# Patient Record
Sex: FEMALE | Race: OTHER | HISPANIC OR LATINO
[De-identification: names, ages, dates, MRNs, and addresses within clinical notes are randomized per-mention and may not be internally consistent; named-entity substitution may affect disease eponyms.]

---

## 2017-05-22 ENCOUNTER — APPOINTMENT (OUTPATIENT)
Dept: ULTRASOUND IMAGING | Facility: CLINIC | Age: 37
End: 2017-05-22

## 2017-05-22 ENCOUNTER — OUTPATIENT (OUTPATIENT)
Dept: OUTPATIENT SERVICES | Facility: HOSPITAL | Age: 37
LOS: 1 days | End: 2017-05-22
Payer: COMMERCIAL

## 2017-05-22 DIAGNOSIS — Z98.51 TUBAL LIGATION STATUS: Chronic | ICD-10-CM

## 2017-05-22 DIAGNOSIS — Z00.8 ENCOUNTER FOR OTHER GENERAL EXAMINATION: ICD-10-CM

## 2017-05-22 PROCEDURE — 76856 US EXAM PELVIC COMPLETE: CPT

## 2017-05-22 PROCEDURE — 76830 TRANSVAGINAL US NON-OB: CPT

## 2017-05-26 ENCOUNTER — RESULT REVIEW (OUTPATIENT)
Age: 37
End: 2017-05-26

## 2017-06-14 ENCOUNTER — OUTPATIENT (OUTPATIENT)
Dept: OUTPATIENT SERVICES | Facility: HOSPITAL | Age: 37
LOS: 1 days | End: 2017-06-14
Payer: MEDICAID

## 2017-06-14 ENCOUNTER — APPOINTMENT (OUTPATIENT)
Dept: RADIOLOGY | Facility: CLINIC | Age: 37
End: 2017-06-14

## 2017-06-14 DIAGNOSIS — Z98.51 TUBAL LIGATION STATUS: Chronic | ICD-10-CM

## 2017-06-14 DIAGNOSIS — Z00.8 ENCOUNTER FOR OTHER GENERAL EXAMINATION: ICD-10-CM

## 2017-06-14 PROCEDURE — 73564 X-RAY EXAM KNEE 4 OR MORE: CPT | Mod: 26,RT

## 2017-06-14 PROCEDURE — 73564 X-RAY EXAM KNEE 4 OR MORE: CPT

## 2017-10-31 ENCOUNTER — INPATIENT (INPATIENT)
Facility: HOSPITAL | Age: 37
LOS: 1 days | Discharge: HOME | End: 2017-11-02
Attending: INTERNAL MEDICINE | Admitting: INTERNAL MEDICINE
Payer: MEDICAID

## 2017-10-31 VITALS — WEIGHT: 197.98 LBS

## 2017-10-31 DIAGNOSIS — Z98.51 TUBAL LIGATION STATUS: Chronic | ICD-10-CM

## 2017-10-31 LAB
ADD ON TEST-SPECIMEN IN LAB: SIGNIFICANT CHANGE UP
ALBUMIN SERPL ELPH-MCNC: 4.1 G/DL — SIGNIFICANT CHANGE UP (ref 3.3–5)
ALP SERPL-CCNC: 105 U/L — SIGNIFICANT CHANGE UP (ref 40–120)
ALT FLD-CCNC: 21 U/L — SIGNIFICANT CHANGE UP (ref 12–78)
ANION GAP SERPL CALC-SCNC: 6 MMOL/L — SIGNIFICANT CHANGE UP (ref 5–17)
APPEARANCE UR: CLEAR — SIGNIFICANT CHANGE UP
APTT BLD: 33 SEC — SIGNIFICANT CHANGE UP (ref 27.5–37.4)
AST SERPL-CCNC: 15 U/L — SIGNIFICANT CHANGE UP (ref 15–37)
BASOPHILS # BLD AUTO: 0.1 K/UL — SIGNIFICANT CHANGE UP (ref 0–0.2)
BASOPHILS NFR BLD AUTO: 0 % — SIGNIFICANT CHANGE UP (ref 0–2)
BILIRUB SERPL-MCNC: 0.5 MG/DL — SIGNIFICANT CHANGE UP (ref 0.2–1.2)
BILIRUB UR-MCNC: NEGATIVE — SIGNIFICANT CHANGE UP
BUN SERPL-MCNC: 12 MG/DL — SIGNIFICANT CHANGE UP (ref 7–23)
CALCIUM SERPL-MCNC: 9.1 MG/DL — SIGNIFICANT CHANGE UP (ref 8.5–10.1)
CHLORIDE SERPL-SCNC: 107 MMOL/L — SIGNIFICANT CHANGE UP (ref 96–108)
CO2 SERPL-SCNC: 24 MMOL/L — SIGNIFICANT CHANGE UP (ref 22–31)
COLOR SPEC: YELLOW — SIGNIFICANT CHANGE UP
CREAT SERPL-MCNC: 0.76 MG/DL — SIGNIFICANT CHANGE UP (ref 0.5–1.3)
DIFF PNL FLD: (no result)
EOSINOPHIL # BLD AUTO: 0.1 K/UL — SIGNIFICANT CHANGE UP (ref 0–0.5)
EOSINOPHIL NFR BLD AUTO: 0 % — SIGNIFICANT CHANGE UP (ref 0–6)
GLUCOSE SERPL-MCNC: 103 MG/DL — HIGH (ref 70–99)
GLUCOSE UR QL: NEGATIVE MG/DL — SIGNIFICANT CHANGE UP
HCT VFR BLD CALC: 42.1 % — SIGNIFICANT CHANGE UP (ref 34.5–45)
HGB BLD-MCNC: 13.9 G/DL — SIGNIFICANT CHANGE UP (ref 11.5–15.5)
INR BLD: 1.06 RATIO — SIGNIFICANT CHANGE UP (ref 0.88–1.16)
KETONES UR-MCNC: (no result)
LEUKOCYTE ESTERASE UR-ACNC: (no result)
LYMPHOCYTES # BLD AUTO: 16 % — SIGNIFICANT CHANGE UP (ref 13–44)
LYMPHOCYTES # BLD AUTO: 2.9 K/UL — SIGNIFICANT CHANGE UP (ref 1–3.3)
MANUAL DIF COMMENT BLD-IMP: SIGNIFICANT CHANGE UP
MCHC RBC-ENTMCNC: 26.9 PG — LOW (ref 27–34)
MCHC RBC-ENTMCNC: 33 GM/DL — SIGNIFICANT CHANGE UP (ref 32–36)
MCV RBC AUTO: 81.6 FL — SIGNIFICANT CHANGE UP (ref 80–100)
MONOCYTES # BLD AUTO: 0.9 K/UL — SIGNIFICANT CHANGE UP (ref 0–0.9)
MONOCYTES NFR BLD AUTO: 4 % — SIGNIFICANT CHANGE UP (ref 2–14)
NEUTROPHILS # BLD AUTO: 15 K/UL — HIGH (ref 1.8–7.4)
NEUTROPHILS NFR BLD AUTO: 80 % — HIGH (ref 43–77)
NITRITE UR-MCNC: NEGATIVE — SIGNIFICANT CHANGE UP
PH UR: 5 — SIGNIFICANT CHANGE UP (ref 5–8)
PLAT MORPH BLD: NORMAL — SIGNIFICANT CHANGE UP
PLATELET # BLD AUTO: 377 K/UL — SIGNIFICANT CHANGE UP (ref 150–400)
POTASSIUM SERPL-MCNC: 3.6 MMOL/L — SIGNIFICANT CHANGE UP (ref 3.5–5.3)
POTASSIUM SERPL-SCNC: 3.6 MMOL/L — SIGNIFICANT CHANGE UP (ref 3.5–5.3)
PROT SERPL-MCNC: 8 GM/DL — SIGNIFICANT CHANGE UP (ref 6–8.3)
PROT UR-MCNC: NEGATIVE MG/DL — SIGNIFICANT CHANGE UP
PROTHROM AB SERPL-ACNC: 11.5 SEC — SIGNIFICANT CHANGE UP (ref 9.8–12.7)
RBC # BLD: 5.16 M/UL — SIGNIFICANT CHANGE UP (ref 3.8–5.2)
RBC # FLD: 13.6 % — SIGNIFICANT CHANGE UP (ref 10.3–14.5)
RBC BLD AUTO: NORMAL — SIGNIFICANT CHANGE UP
RBC CASTS # UR COMP ASSIST: SIGNIFICANT CHANGE UP /HPF (ref 0–4)
SODIUM SERPL-SCNC: 137 MMOL/L — SIGNIFICANT CHANGE UP (ref 135–145)
SP GR SPEC: 1.02 — SIGNIFICANT CHANGE UP (ref 1.01–1.02)
UROBILINOGEN FLD QL: NEGATIVE MG/DL — SIGNIFICANT CHANGE UP
WBC # BLD: 19 K/UL — HIGH (ref 3.8–10.5)
WBC # FLD AUTO: 19 K/UL — HIGH (ref 3.8–10.5)
WBC UR QL: SIGNIFICANT CHANGE UP

## 2017-10-31 PROCEDURE — 74177 CT ABD & PELVIS W/CONTRAST: CPT | Mod: 26

## 2017-10-31 PROCEDURE — 99285 EMERGENCY DEPT VISIT HI MDM: CPT

## 2017-10-31 PROCEDURE — 70450 CT HEAD/BRAIN W/O DYE: CPT | Mod: 26

## 2017-10-31 PROCEDURE — 71020: CPT | Mod: 26

## 2017-10-31 RX ORDER — SODIUM CHLORIDE 9 MG/ML
3 INJECTION INTRAMUSCULAR; INTRAVENOUS; SUBCUTANEOUS ONCE
Qty: 0 | Refills: 0 | Status: COMPLETED | OUTPATIENT
Start: 2017-10-31 | End: 2017-10-31

## 2017-10-31 RX ORDER — SODIUM CHLORIDE 9 MG/ML
1500 INJECTION INTRAMUSCULAR; INTRAVENOUS; SUBCUTANEOUS ONCE
Qty: 0 | Refills: 0 | Status: COMPLETED | OUTPATIENT
Start: 2017-10-31 | End: 2017-10-31

## 2017-10-31 RX ORDER — ONDANSETRON 8 MG/1
4 TABLET, FILM COATED ORAL ONCE
Qty: 0 | Refills: 0 | Status: COMPLETED | OUTPATIENT
Start: 2017-10-31 | End: 2017-10-31

## 2017-10-31 RX ORDER — MORPHINE SULFATE 50 MG/1
4 CAPSULE, EXTENDED RELEASE ORAL ONCE
Qty: 0 | Refills: 0 | Status: DISCONTINUED | OUTPATIENT
Start: 2017-10-31 | End: 2017-10-31

## 2017-10-31 RX ORDER — PIPERACILLIN AND TAZOBACTAM 4; .5 G/20ML; G/20ML
3.38 INJECTION, POWDER, LYOPHILIZED, FOR SOLUTION INTRAVENOUS ONCE
Qty: 0 | Refills: 0 | Status: COMPLETED | OUTPATIENT
Start: 2017-10-31 | End: 2017-10-31

## 2017-10-31 RX ADMIN — ONDANSETRON 4 MILLIGRAM(S): 8 TABLET, FILM COATED ORAL at 21:36

## 2017-10-31 RX ADMIN — PIPERACILLIN AND TAZOBACTAM 200 GRAM(S): 4; .5 INJECTION, POWDER, LYOPHILIZED, FOR SOLUTION INTRAVENOUS at 23:46

## 2017-10-31 RX ADMIN — SODIUM CHLORIDE 3 MILLILITER(S): 9 INJECTION INTRAMUSCULAR; INTRAVENOUS; SUBCUTANEOUS at 20:11

## 2017-10-31 RX ADMIN — MORPHINE SULFATE 4 MILLIGRAM(S): 50 CAPSULE, EXTENDED RELEASE ORAL at 21:35

## 2017-10-31 RX ADMIN — SODIUM CHLORIDE 1000 MILLILITER(S): 9 INJECTION INTRAMUSCULAR; INTRAVENOUS; SUBCUTANEOUS at 20:11

## 2017-10-31 NOTE — ED STATDOCS - MEDICAL DECISION MAKING DETAILS
Mitchell WHITE: Patient provided with the results of the labs and imaging. Outpatient follow up and instructions to return if any health concerns provided.

## 2017-10-31 NOTE — ED STATDOCS - GASTROINTESTINAL, MLM
abdomen soft, +diffuse abdominal tenderness, and non-distended. Bowel sounds present. abdomen soft, +diffuse abdominal tenderness worse in epigastric region, and non-distended. Bowel sounds present.

## 2017-10-31 NOTE — ED ADULT NURSE NOTE - OBJECTIVE STATEMENT
pt c/o abdominal pain, 6/10 denies vomiting states she ate some honey then developed the pain. No c/o diarrhea.

## 2017-10-31 NOTE — ED STATDOCS - PROGRESS NOTE DETAILS
Patient seen and evaluated, ED attending note and orders reviewed, will continue with patient follow up and care -Joaquin Upton PA-C Patient updated on labs, reports her pain is improving with just IVF, right now abdomen is mildly TTP diffusely, she is comfortably waiting for CT with family at her bedside -Joaquin Upton PA-C Admitted patient for duodenitis, leukocytosis, and inability to tolerate PO/pain. Patient informed. Hospitalist admission is appreciated. Spoke with Dr. Beth YATES who will see patient once admitted. GI consult is appreciated.

## 2017-10-31 NOTE — ED STATDOCS - CARE PLAN
Principal Discharge DX:	Abdominal pain, unspecified abdominal location  Secondary Diagnosis:	Vertigo Principal Discharge DX:	Duodenitis  Secondary Diagnosis:	Vertigo  Secondary Diagnosis:	Abdominal pain, unspecified abdominal location

## 2017-10-31 NOTE — ED STATDOCS - NS_ ATTENDINGSCRIBEDETAILS _ED_A_ED_FT
I Mukesh Medrano MD saw and examined the patient. Scribe documented for me and under my supervision. I have modified the scribe's documentation where necessary to reflect my history, physical exam findings and other relevant documentations.

## 2017-10-31 NOTE — ED STATDOCS - NEUROLOGICAL, MLM
sensation is normal and strength is normal. sensation is normal and strength is normal. CNs 2-12 intact. No dysarthria. No aphasia. NIH=0, No facial asymmetry. No inattention. EOMI. 5/5 strength on flexion and extension.

## 2017-10-31 NOTE — ED STATDOCS - OBJECTIVE STATEMENT
36 yo female s/p tubal ligation and resection of benign tumor on left thigh, presents c/o dizziness, abdominal pain, N/V that began 30 minutes after eating raw honey.  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  Denies CP, fever, chills, blood in stool or vomitus, vaginal bleeding. 38 yo female with PMH of tubal ligation and resection of benign tumor on left thigh, presents c/o dizziness, abdominal pain, N/V that began 30 minutes after eating raw honey.  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  Denies CP, fever, chills, blood in stool or vomitus, vaginal bleeding. Has pain that is worse on the epigastrium, that occasionally radiates to the back. Also states she is vertiginous, and unsteady on her feet and has mild global gradual onset HA.

## 2017-10-31 NOTE — ED STATDOCS - ATTENDING CONTRIBUTION TO CARE
I Mukesh Medrano MD saw and examined the patient. MLP saw and examined the patient under my supervision. I agree with [facility]'s plan, assessment and care of this patient in the ED as well as her documentation.

## 2017-10-31 NOTE — ED ADULT TRIAGE NOTE - CHIEF COMPLAINT QUOTE
pt presents to ED with complaints of Abdominal Pain for 2 hours PTA pt states she ate honey and then developed, pt states she vomited x 4 since

## 2017-11-01 LAB
ANION GAP SERPL CALC-SCNC: 3 MMOL/L — LOW (ref 5–17)
BUN SERPL-MCNC: 10 MG/DL — SIGNIFICANT CHANGE UP (ref 7–23)
CALCIUM SERPL-MCNC: 8.7 MG/DL — SIGNIFICANT CHANGE UP (ref 8.5–10.1)
CHLORIDE SERPL-SCNC: 107 MMOL/L — SIGNIFICANT CHANGE UP (ref 96–108)
CO2 SERPL-SCNC: 28 MMOL/L — SIGNIFICANT CHANGE UP (ref 22–31)
CREAT SERPL-MCNC: 0.75 MG/DL — SIGNIFICANT CHANGE UP (ref 0.5–1.3)
CULTURE RESULTS: SIGNIFICANT CHANGE UP
GLUCOSE SERPL-MCNC: 104 MG/DL — HIGH (ref 70–99)
HCT VFR BLD CALC: 38.1 % — SIGNIFICANT CHANGE UP (ref 34.5–45)
HGB BLD-MCNC: 12.6 G/DL — SIGNIFICANT CHANGE UP (ref 11.5–15.5)
MCHC RBC-ENTMCNC: 27.4 PG — SIGNIFICANT CHANGE UP (ref 27–34)
MCHC RBC-ENTMCNC: 33.1 GM/DL — SIGNIFICANT CHANGE UP (ref 32–36)
MCV RBC AUTO: 82.6 FL — SIGNIFICANT CHANGE UP (ref 80–100)
PLATELET # BLD AUTO: 277 K/UL — SIGNIFICANT CHANGE UP (ref 150–400)
POTASSIUM SERPL-MCNC: 3.7 MMOL/L — SIGNIFICANT CHANGE UP (ref 3.5–5.3)
POTASSIUM SERPL-SCNC: 3.7 MMOL/L — SIGNIFICANT CHANGE UP (ref 3.5–5.3)
RBC # BLD: 4.61 M/UL — SIGNIFICANT CHANGE UP (ref 3.8–5.2)
RBC # FLD: 14.5 % — SIGNIFICANT CHANGE UP (ref 10.3–14.5)
SODIUM SERPL-SCNC: 138 MMOL/L — SIGNIFICANT CHANGE UP (ref 135–145)
SPECIMEN SOURCE: SIGNIFICANT CHANGE UP
WBC # BLD: 12.6 K/UL — HIGH (ref 3.8–10.5)
WBC # FLD AUTO: 12.6 K/UL — HIGH (ref 3.8–10.5)

## 2017-11-01 RX ORDER — ACETAMINOPHEN 500 MG
650 TABLET ORAL EVERY 6 HOURS
Qty: 0 | Refills: 0 | Status: DISCONTINUED | OUTPATIENT
Start: 2017-11-01 | End: 2017-11-02

## 2017-11-01 RX ORDER — ENOXAPARIN SODIUM 100 MG/ML
40 INJECTION SUBCUTANEOUS EVERY 24 HOURS
Qty: 0 | Refills: 0 | Status: DISCONTINUED | OUTPATIENT
Start: 2017-11-01 | End: 2017-11-02

## 2017-11-01 RX ORDER — INFLUENZA VIRUS VACCINE 15; 15; 15; 15 UG/.5ML; UG/.5ML; UG/.5ML; UG/.5ML
0.5 SUSPENSION INTRAMUSCULAR ONCE
Qty: 0 | Refills: 0 | Status: DISCONTINUED | OUTPATIENT
Start: 2017-11-01 | End: 2017-11-02

## 2017-11-01 RX ORDER — ONDANSETRON 8 MG/1
4 TABLET, FILM COATED ORAL EVERY 4 HOURS
Qty: 0 | Refills: 0 | Status: DISCONTINUED | OUTPATIENT
Start: 2017-11-01 | End: 2017-11-02

## 2017-11-01 RX ORDER — PANTOPRAZOLE SODIUM 20 MG/1
40 TABLET, DELAYED RELEASE ORAL
Qty: 0 | Refills: 0 | Status: DISCONTINUED | OUTPATIENT
Start: 2017-11-01 | End: 2017-11-02

## 2017-11-01 RX ADMIN — Medication 650 MILLIGRAM(S): at 08:48

## 2017-11-01 RX ADMIN — ENOXAPARIN SODIUM 40 MILLIGRAM(S): 100 INJECTION SUBCUTANEOUS at 21:01

## 2017-11-01 RX ADMIN — PANTOPRAZOLE SODIUM 40 MILLIGRAM(S): 20 TABLET, DELAYED RELEASE ORAL at 17:14

## 2017-11-01 RX ADMIN — Medication 1 TABLET(S): at 17:14

## 2017-11-01 NOTE — ED ADULT NURSE REASSESSMENT NOTE - NS ED NURSE REASSESS COMMENT FT1
attempt to call report to [facility], they will call back 20mins.  Pt aware of pending transfer to room.
pt sleeping in bed. no s/s of acute distress. waiting for hospitalist orders. will continue to monitor.
Pt currently sitting with family at bedside, aware of POC and plan to bed admitted. IV antibiotics infusing, no c/o pain at this moment. Hourly rounding completed.

## 2017-11-01 NOTE — CONSULT NOTE ADULT - ASSESSMENT
Enteritis/Duodenitis  epig pain     agree with zosyn for now  protonix  clear liquids  antiemetic prn  stool h pylori

## 2017-11-01 NOTE — CONSULT NOTE ADULT - SUBJECTIVE AND OBJECTIVE BOX
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   Gastritis    Hiatal hernia.    Past Surgical History:  Tubal ligation status 8 years ago.     Family Hx: unknown  Social History: Lives at home, has 5 children, unemployed. Nonsmoker, no ETOH or drug use.   Meds: reviewed, not on standing home meds.   NKDA    Social hx as above      REVIEW OF SYSTEMS      General:	No fever or chills    Skin/Breast: No jaundice or rash   		  ENMT: denies sore throat or thrush    Respiratory and Thorax: Denies dyspnea or cough or shortness of breath  	  Cardiovascular: Denies chest pain or palpitations 	    Gastrointestinal: Denies jaundice or pruritis    Genitourinary: Denies dysuria or hematuria	    Musculoskeletal: Denies muscular pain or swelling	    Neurological: Denies confusion or tremor	    Hematology/Lymphatics: Denies easy bruising or bleeding 	    Endocrine:	Denies polyphagia or polyuria    See above hx otherwise neg for any major organ systems    PHYSICAL EXAM:    Vital Signs Last 24 Hrs  T(C): 36.8 (01 Nov 2017 11:00), Max: 36.9 (31 Oct 2017 20:49)  T(F): 98.2 (01 Nov 2017 11:00), Max: 98.5 (31 Oct 2017 20:49)  HR: 72 (01 Nov 2017 11:00) (72 - 83)  BP: 126/79 (01 Nov 2017 11:00) (126/79 - 148/94)  BP(mean): 105 (31 Oct 2017 18:27) (105 - 105)  RR: 12 (01 Nov 2017 11:00) (12 - 18)  SpO2: 99% (01 Nov 2017 07:03) (99% - 100%)    Constitutional: no acute distress    ENMT: NC/AT scl anicteric opm pink no lesions     Neck: supple. No jvd or LN    Respiratory: Clear     Cardiovascular: RRR s1s2     Gastrointestinal: Pos bs , soft , not tender, no hepatosplenomegaly,  no mass      Back: No CVA tenderness    Extremities: NO cce     Neurological: Alert and oriented x 3     Skin: No rash or jaundice     Date/Time:11-01 @ 08:59    Albumin: --  ALT/SGPT: --  Alk Phos: --  AST/SGOT: --  Bilirubin Direct: --  Bilirubin Total: --  Ca: 8.7  eGFR : 119  eGFR Non-: 103  Lipase: --  Amylase: --  INR: --  PTT: --  Date/Time:10-31 @ 19:41    Albumin: 4.1  ALT/SGPT: 21  Alk Phos: 105  AST/SGOT: 15  Bilirubin Direct: --  Bilirubin Total: 0.5  Ca: 9.1  eGFR : 117  eGFR Non-: 101  Lipase: 99  Amylase: --  INR: 1.06  PTT: --      11-01    138  |  107  |  10  ----------------------------<  104<H>  3.7   |  28  |  0.75    Ca    8.7      01 Nov 2017 08:59    TPro  8.0  /  Alb  4.1  /  TBili  0.5  /  DBili  x   /  AST  15  /  ALT  21  /  AlkPhos  105  10-31                            12.6   12.6  )-----------( 277      ( 01 Nov 2017 08:59 )             38.1   < from: CT Abdomen and Pelvis w/ IV Cont (10.31.17 @ 22:08) >    EXAM:  CT ABDOMEN AND PELVIS IC                            PROCEDURE DATE:  10/31/2017          INTERPRETATION:  CLINICAL HISTORY: Nausea and abdominal pain.    TECHNIQUE:  CT scan of the abdomen and pelvis with IV contrast.  Transaxial images wereacquired from the domes of the diaphragm to the   symphysis pubis with intravenous contrast. Oral contrast was withheld as   per the referring clinician's request. Coronal and sagittal images were   also provided from the transaxial source data. 90mLs of Omnipaque 350 was   administered intravenously without complication and 10 mLs was discarded.    COMPARISON: There is no prior study for comparison.    FINDINGS:   The heart is not enlarged. The lung bases are clear.     There is suboptimal assessment of the bowel without oral contrast.  The large and small bowel are normal in caliber without obstruction.   There is suggestion of wall thickening of the third and fourth portions   of the duodenum with stranding and fluid in the adjacent retroperitoneal   fat. There is no free intraperitoneal air or abdominal abscess.  The   appendix is normal. The remainder of the bowel is of normal wall   thickness.    The liver, gallbladder, spleen, and adrenal glands are unremarkable. The   pancreas enhances homogeneously. The stranding in the retroperitoneal fat   is mostly inferior and posteriorly, however, could the related to a   pancreatic process. There is no main pancreatic duct dilatation. The   kidneys enhance symmetrically without hydronephrosis.     The abdominal aorta is normal in caliber. There is no retroperitoneal,   pelvic or inguinal adenopathy. There is no ascites.    Urinary bladder, uterus and adnexal structures are within normal limits.   Trace amount of free fluid in the pelvis.    The visualized osseous structures demonstrate no acute abnormality.    IMPRESSION:   Likely duodenitis, however, correlation with pancreatic enzymes is   recommended to exclude pancreatitis as etiology for the findings.                ALEXIS MITTAL; ATTENDING RADIOLOGIST  This document has been electronically signed. Oct 31 2017 10:58PM                < end of copied text >  (reviewed with Dr. Shine)=-duodenitis -enteritis

## 2017-11-01 NOTE — H&P ADULT - ASSESSMENT
This is a 38 yo female with remote history of benign tumor on left thigh resection admitted for ongoing nausea and vomiting in the setting of raw honey ingestion:    # Abdominal Pain  # Nausea and Vomiting  # Leukocytosis  - patient with symptoms suggestive of food poisoning after ingestion of raw honey currently hemodynamically stable.   - no diarrheal symptoms and currently afebrile.   - will continue supportive care with prn anti-emetics and slowly advance diet.   - Full liquids and advance.   - s/p 1.5L IVFs and IV Zosyn.   - stop further antibiotics as leukocytosis likely reactive in nature.   - repeat stat labs and trend CBC.   - GI consult aware.     # Headache - mild in the setting of illness.   - stable Head CT .  - cont w/ Tylenol prn.     DVT ppx: lovenox  Dispo: admit to floor, anticipate dc home in 1 day.     Discussed w/ staff and patient.   Total time > 65 mins. This is a 36 yo female with remote history of benign tumor on left thigh resection admitted for ongoing nausea and vomiting in the setting of raw honey ingestion:    # Abdominal Pain  # Nausea and Vomiting  # Leukocytosis  # Duodenitis  - patient with symptoms suggestive of food poisoning after ingestion of raw honey currently hemodynamically stable.   - no diarrheal symptoms and currently afebrile.   - will continue supportive care with prn anti-emetics and slowly advance diet.   - Full liquids and advance.   - s/p 1.5L IVFs and IV Zosyn.   - stop further antibiotics as leukocytosis likely reactive in nature.   - repeat stat labs and trend CBC.   - GI consult aware.     # Headache - mild in the setting of illness.   - stable Head CT .  - cont w/ Tylenol prn.     # Microscopic Hematuria - noted in UA, likely 2/2 illness.   - recommend outpatient repeat in 1-2 weeks.    DVT ppx: lovenox  Dispo: admit to floor, anticipate dc home in 1 day.     Discussed w/ staff and patient.   Total time > 65 mins.

## 2017-11-01 NOTE — H&P ADULT - HISTORY OF PRESENT ILLNESS
This is a 36 yo female with remote history of benign tumor on left thigh resection admitted for ongoing nausea and vomiting in the setting of raw honey ingestion. 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 Approximately half hour later, she experienced abdominal pain, nausea and vomiting. No reports of diarrhea, chest pain, fevers or chills.     In the ED, CT abd /pelvis revealed concerns for duodenitis. Initial labs WBC 19K. Patient was given IV fluids, IV Zosyn, anti-emetics and GI consult placed with Dr. Beth castillo.     ROS: negative unless stated above. Currently feels improved with only mild lower abdominal pain.    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   Gastritis    Hiatal hernia.    Past Surgical History:  Tubal ligation status 8 years ago.     Family Hx: unknown  Social History: Lives at home, has 5 children, unemployed. Nonsmoker, no ETOH or drug use.   Meds: reviewed, not on standing home meds.   NKDA      Vital Signs Last 24 Hrs  T(C): 36.6 (01 Nov 2017 07:03), Max: 36.9 (31 Oct 2017 20:49)  T(F): 97.9 (01 Nov 2017 07:03), Max: 98.5 (31 Oct 2017 20:49)  HR: 77 (01 Nov 2017 07:03) (77 - 83)  BP: 127/76 (01 Nov 2017 07:03) (127/76 - 148/94)  BP(mean): 105 (31 Oct 2017 18:27) (105 - 105)  RR: 18 (01 Nov 2017 07:03) (16 - 18)  SpO2: 99% (01 Nov 2017 07:03) (99% - 100%)    PHYSICAL EXAM:  Constitutional: NAD, awake and alert, well-developed  female, appears comfortable.   HEENT: PERR, EOMI, Normal Hearing, MMM  Neck: Soft and supple, No LAD, No JVD  Respiratory: Breath sounds are clear bilaterally, No wheezing, rales or rhonchi  Cardiovascular: S1 and S2, regular rate and rhythm, no Murmurs, gallops or rubs  Gastrointestinal: Bowel Sounds present, soft, mild mid epigastric Tenderness to deep palpation, nondistended, no guarding, no rebound  Extremities: No peripheral edema  Vascular: 2+ peripheral pulses  Neurological: A/O x 3, no focal deficits  Musculoskeletal: 5/5 strength b/l upper and lower extremities  Skin: No rashes    MEDICATIONS  (STANDING):  enoxaparin Injectable 40 milliGRAM(s) SubCutaneous every 24 hours  multivitamin 1 Tablet(s) Oral daily    LABS: All Labs Reviewed:                        13.9   19.0  )-----------( 377      ( 31 Oct 2017 19:41 )             42.1     10-31    137  |  107  |  12  ----------------------------<  103<H>  3.6   |  24  |  0.76    Ca    9.1      31 Oct 2017 19:41    TPro  8.0  /  Alb  4.1  /  TBili  0.5  /  DBili  x   /  AST  15  /  ALT  21  /  AlkPhos  105  10-31    PT/INR - ( 31 Oct 2017 19:41 )   PT: 11.5 sec;   INR: 1.06 ratio    PTT - ( 31 Oct 2017 19:41 )  PTT:33.0 sec  Lipase, Serum: 99 U/L (10.31.17 @ 19:41)    Blood Culture: pending.   CT Abdomen and Pelvis w/ IV Cont (10.31.17 @ 22:08) >  IMPRESSION:   Likely duodenitis, however, correlation with pancreatic enzymes is   recommended to exclude pancreatitis as etiology for the findings.       CT Head No Cont (10.31.17 @ 22:05) >  Stable exam. No acute intracranial hemorrhage, mass effect or evidence of   acute territorial infarct.

## 2017-11-02 ENCOUNTER — TRANSCRIPTION ENCOUNTER (OUTPATIENT)
Age: 37
End: 2017-11-02

## 2017-11-02 VITALS
TEMPERATURE: 98 F | RESPIRATION RATE: 16 BRPM | OXYGEN SATURATION: 97 % | HEART RATE: 66 BPM | SYSTOLIC BLOOD PRESSURE: 119 MMHG | DIASTOLIC BLOOD PRESSURE: 70 MMHG

## 2017-11-02 LAB
ANION GAP SERPL CALC-SCNC: 6 MMOL/L — SIGNIFICANT CHANGE UP (ref 5–17)
BUN SERPL-MCNC: 9 MG/DL — SIGNIFICANT CHANGE UP (ref 7–23)
CALCIUM SERPL-MCNC: 9.1 MG/DL — SIGNIFICANT CHANGE UP (ref 8.5–10.1)
CHLORIDE SERPL-SCNC: 109 MMOL/L — HIGH (ref 96–108)
CO2 SERPL-SCNC: 27 MMOL/L — SIGNIFICANT CHANGE UP (ref 22–31)
CREAT SERPL-MCNC: 0.85 MG/DL — SIGNIFICANT CHANGE UP (ref 0.5–1.3)
GLUCOSE SERPL-MCNC: 103 MG/DL — HIGH (ref 70–99)
HCT VFR BLD CALC: 39.9 % — SIGNIFICANT CHANGE UP (ref 34.5–45)
HGB BLD-MCNC: 13.1 G/DL — SIGNIFICANT CHANGE UP (ref 11.5–15.5)
MCHC RBC-ENTMCNC: 27.4 PG — SIGNIFICANT CHANGE UP (ref 27–34)
MCHC RBC-ENTMCNC: 32.8 GM/DL — SIGNIFICANT CHANGE UP (ref 32–36)
MCV RBC AUTO: 83.6 FL — SIGNIFICANT CHANGE UP (ref 80–100)
PLATELET # BLD AUTO: 338 K/UL — SIGNIFICANT CHANGE UP (ref 150–400)
POTASSIUM SERPL-MCNC: 3.8 MMOL/L — SIGNIFICANT CHANGE UP (ref 3.5–5.3)
POTASSIUM SERPL-SCNC: 3.8 MMOL/L — SIGNIFICANT CHANGE UP (ref 3.5–5.3)
RBC # BLD: 4.77 M/UL — SIGNIFICANT CHANGE UP (ref 3.8–5.2)
RBC # FLD: 14 % — SIGNIFICANT CHANGE UP (ref 10.3–14.5)
SODIUM SERPL-SCNC: 142 MMOL/L — SIGNIFICANT CHANGE UP (ref 135–145)
WBC # BLD: 9.2 K/UL — SIGNIFICANT CHANGE UP (ref 3.8–10.5)
WBC # FLD AUTO: 9.2 K/UL — SIGNIFICANT CHANGE UP (ref 3.8–10.5)

## 2017-11-02 RX ORDER — ACETAMINOPHEN 500 MG
2 TABLET ORAL
Qty: 0 | Refills: 0 | COMMUNITY
Start: 2017-11-02

## 2017-11-02 RX ADMIN — PANTOPRAZOLE SODIUM 40 MILLIGRAM(S): 20 TABLET, DELAYED RELEASE ORAL at 06:01

## 2017-11-02 NOTE — DISCHARGE NOTE ADULT - PLAN OF CARE
Improvement. Continue to drink plenty of water 4-6 glasses a day to stay hydrated. NO MORE EATING RAW HONEY. Follow up with PCP in 1 week for check up.

## 2017-11-02 NOTE — DISCHARGE NOTE ADULT - PATIENT PORTAL LINK FT
“You can access the Follow[facility] Patient Portal, offered by [facility], by registering with the following website: [email or web address]”

## 2017-11-02 NOTE — DISCHARGE NOTE ADULT - MEDICATION SUMMARY - MEDICATIONS TO STOP TAKING
I will STOP taking the medications listed below when I get home from the hospital:    doxycycline hyclate 50 mg oral capsule  -- 1 cap(s) by mouth 2 times a day    acetaminophen-oxyCODONE 325 mg-5 mg oral tablet  -- 2 tab(s) by mouth every 6 hours, As needed, Moderate Pain

## 2017-11-02 NOTE — DISCHARGE NOTE ADULT - ADDITIONAL INSTRUCTIONS
PCP.  Please get repeat Urine analysis outpatient in 1 week to assess for any microscopic blood as this was noted here in the hospital. No signs of bleeding.

## 2017-11-02 NOTE — PROGRESS NOTE ADULT - SUBJECTIVE AND OBJECTIVE BOX
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   Gastritis    Hiatal hernia.    Past Surgical History:  Tubal ligation status 8 years ago.     Family Hx: unknown  Social History: Lives at home, has 5 children, unemployed. Nonsmoker, no ETOH or drug use.   Meds: reviewed, not on standing home meds.   NKDA      Vital Signs Last 24 Hrs  T(C): 36.6 (01 Nov 2017 07:03), Max: 36.9 (31 Oct 2017 20:49)  T(F): 97.9 (01 Nov 2017 07:03), Max: 98.5 (31 Oct 2017 20:49)  HR: 77 (01 Nov 2017 07:03) (77 - 83)  BP: 127/76 (01 Nov 2017 07:03) (127/76 - 148/94)  BP(mean): 105 (31 Oct 2017 18:27) (105 - 105)  RR: 18 (01 Nov 2017 07:03) (16 - 18)  SpO2: 99% (01 Nov 2017 07:03) (99% - 100%)    PHYSICAL EXAM:  Constitutional: NAD, awake and alert, well-developed  female, appears comfortable.   HEENT: PERR, EOMI, Normal Hearing, MMM  Neck: Soft and supple, No LAD, No JVD  Respiratory: Breath sounds are clear bilaterally, No wheezing, rales or rhonchi  Cardiovascular: S1 and S2, regular rate and rhythm, no Murmurs, gallops or rubs  Gastrointestinal: Bowel Sounds present, soft, mild mid epigastric Tenderness to deep palpation, nondistended, no guarding, no rebound  Extremities: No peripheral edema  Vascular: 2+ peripheral pulses  Neurological: A/O x 3, no focal deficits  Musculoskeletal: 5/5 strength b/l upper and lower extremities  Skin: No rashes    MEDICATIONS  (STANDING):  enoxaparin Injectable 40 milliGRAM(s) SubCutaneous every 24 hours  multivitamin 1 Tablet(s) Oral daily    LABS: All Labs Reviewed:                        13.9   19.0  )-----------( 377      ( 31 Oct 2017 19:41 )             42.1     10-31    137  |  107  |  12  ----------------------------<  103<H>  3.6   |  24  |  0.76    Ca    9.1      31 Oct 2017 19:41    TPro  8.0  /  Alb  4.1  /  TBili  0.5  /  DBili  x   /  AST  15  /  ALT  21  /  AlkPhos  105  10-31    PT/INR - ( 31 Oct 2017 19:41 )   PT: 11.5 sec;   INR: 1.06 ratio    PTT - ( 31 Oct 2017 19:41 )  PTT:33.0 sec  Lipase, Serum: 99 U/L (10.31.17 @ 19:41)    Blood Culture: pending.   CT Abdomen and Pelvis w/ IV Cont (10.31.17 @ 22:08) >  IMPRESSION:   Likely duodenitis, however, correlation with pancreatic enzymes is   recommended to exclude pancreatitis as etiology for the findings.       CT Head No Cont (10.31.17 @ 22:05) >  Stable exam. No acute intracranial hemorrhage, mass effect or evidence of   acute territorial infarct. (01 Nov 2017 08:45)      Allergies    No Known Allergies    Intolerances        MEDICATIONS  (STANDING):  enoxaparin Injectable 40 milliGRAM(s) SubCutaneous every 24 hours  influenza   Vaccine 0.5 milliLiter(s) IntraMuscular once  multivitamin 1 Tablet(s) Oral daily  pantoprazole    Tablet 40 milliGRAM(s) Oral before breakfast    MEDICATIONS  (PRN):  acetaminophen   Tablet. 650 milliGRAM(s) Oral every 6 hours PRN Mild Pain (1 - 3)  ondansetron Injectable 4 milliGRAM(s) IV Push every 4 hours PRN Nausea and/or Vomiting      REVIEW OF SYSTEMS      General:	No fever or chills    Skin/Breast: No jaundice or rash   		  ENMT: denies sore throat or thrush    Respiratory and Thorax: Denies dyspnea or cough or shortness of breath  	  Cardiovascular: Denies chest pain or palpitations 	    Gastrointestinal: Denies jaundice or pruritis    Genitourinary: Denies dysuria or hematuria	    Musculoskeletal: Denies muscular pain or swelling	    Neurological: Denies confusion or tremor	    Hematology/Lymphatics: Denies easy bruising or bleeding 	    Endocrine:	Denies polyphagia or polyuria    See above hx otherwise neg for any major organ systems    PHYSICAL EXAM:    Vital Signs Last 24 Hrs  T(C): 36.6 (02 Nov 2017 11:56), Max: 36.8 (01 Nov 2017 21:09)  T(F): 97.9 (02 Nov 2017 11:56), Max: 98.3 (01 Nov 2017 21:09)  HR: 66 (02 Nov 2017 11:56) (62 - 74)  BP: 119/70 (02 Nov 2017 11:56) (117/67 - 119/70)  BP(mean): --  RR: 16 (02 Nov 2017 11:56) (16 - 16)  SpO2: 97% (02 Nov 2017 11:56) (97% - 99%)    Constitutional: no acute distress    ENMT: NC/AT scl anicteric opm pink no lesions     Neck: supple. No jvd or LN    Respiratory: Clear     Cardiovascular: RRR s1s2     Gastrointestinal: Pos bs , soft , not tender, no hepatosplenomegaly,  no mass      Back: No CVA tenderness    Extremities: NO cce     Neurological: Alert and oriented x 3     Skin: No rash or jaundice    Date/Time:11-02 @ 06:50    ALT/SGPT: --  Albumin: --  AST/SGOT: --  Bilirubin Direct: --  Bilirubin Total: --  Ca: 9.1  eGFR : 102  eGFR Non-: 88  Lipase: --  Amylase: --  INR: --  PTT: --        Date/Time:11-01 @ 08:59    ALT/SGPT: --  Albumin: --  AST/SGOT: --  Bilirubin Direct: --  Bilirubin Total: --  Ca: 8.7  eGFR : 119  eGFR Non-: 103  Lipase: --  Amylase: --  INR: --  PTT: --        Date/Time:10-31 @ 19:41    ALT/SGPT: 21  Albumin: 4.1  AST/SGOT: 15  Bilirubin Direct: --  Bilirubin Total: 0.5  Ca: 9.1  eGFR : 117  eGFR Non-: 101  Lipase: 99  Amylase: --  INR: 1.06  PTT: --            11-02    142  |  109<H>  |  9   ----------------------------<  103<H>  3.8   |  27  |  0.85    Ca    9.1      02 Nov 2017 06:50    TPro  8.0  /  Alb  4.1  /  TBili  0.5  /  DBili  x   /  AST  15  /  ALT  21  /  AlkPhos  105  10-31                            13.1   9.2   )-----------( 338      ( 02 Nov 2017 06:50 )             39.9

## 2017-11-02 NOTE — DISCHARGE NOTE ADULT - CARE PROVIDER_API CALL
Hattie Meléndez), Family Medicine  [address] [place] [address]  Phone: [phone]  Fax: [phone]

## 2017-11-02 NOTE — PROGRESS NOTE ADULT - ASSESSMENT
Enteritis    protonix  adv diet -  discharge with follow up with gastroenterologist in her insurance within one week advised.   rationale for follow up explained to pt

## 2017-11-02 NOTE — DISCHARGE NOTE ADULT - CARE PLAN
Principal Discharge DX:	Duodenitis  Goal:	Improvement.  Instructions for follow-up, activity and diet:	Continue to drink plenty of water 4-6 glasses a day to stay hydrated. NO MORE EATING RAW HONEY. Follow up with PCP in 1 week for check up.

## 2017-11-02 NOTE — DISCHARGE NOTE ADULT - MEDICATION SUMMARY - MEDICATIONS TO TAKE
I will START or STAY ON the medications listed below when I get home from the hospital:    acetaminophen 325 mg oral tablet  -- 2 tab(s) by mouth every 6 hours, As needed, Mild Pain (1 - 3)  -- Indication: For Mild pain    Multiple Vitamins oral tablet  -- 1 tab(s) by mouth once a day  -- Indication: For Vitamin

## 2017-11-02 NOTE — DISCHARGE NOTE ADULT - HOSPITAL COURSE
Chief Complaint/Reason for Admission: Abdominal pain and vomiting.	  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 Approximately half hour later, she experienced abdominal pain, nausea and vomiting. No reports of diarrhea, chest pain, fevers or chills.     In the ED, CT abd /pelvis revealed concerns for duodenitis. Initial labs WBC 19K. Patient was given IV fluids, IV Zosyn, anti-emetics and GI consult placed with Dr. Beth castillo.     11/2/17: Feeling better, no abd pain / afebrile. Tolerating PO. Ready for dc home.     ROS: negative unless stated above.     Vital Signs Last 24 Hrs  T(C): 36.6 (02 Nov 2017 05:33), Max: 36.8 (01 Nov 2017 21:09)  T(F): 97.8 (02 Nov 2017 05:33), Max: 98.3 (01 Nov 2017 21:09)  HR: 62 (02 Nov 2017 05:33) (62 - 74)  BP: 117/67 (02 Nov 2017 05:33) (117/67 - 118/71)  BP(mean): --  RR: 16 (02 Nov 2017 05:33) (16 - 16)  SpO2: 99% (02 Nov 2017 05:33) (98% - 99%)  PHYSICAL EXAM:    Constitutional: NAD, awake and alert, well-developed  HEENT: PERR, EOMI, Normal Hearing, MMM  Neck: Soft and supple, No LAD, No JVD  Respiratory: Breath sounds are clear bilaterally, No wheezing, rales or rhonchi  Cardiovascular: S1 and S2, regular rate and rhythm, no Murmurs, gallops or rubs  Gastrointestinal: Bowel Sounds present, soft, nontender, nondistended, no guarding, no rebound  Extremities: No peripheral edema  Vascular: 2+ peripheral pulses  Neurological: A/O x 3, no focal deficits  Musculoskeletal: 5/5 strength b/l upper and lower extremities  Skin: No rashes    MEDICATIONS  (STANDING):  enoxaparin Injectable 40 milliGRAM(s) SubCutaneous every 24 hours  influenza   Vaccine 0.5 milliLiter(s) IntraMuscular once  multivitamin 1 Tablet(s) Oral daily  pantoprazole    Tablet 40 milliGRAM(s) Oral before breakfast      LABS: All Labs Reviewed:                        13.1   9.2   )-----------( 338      ( 02 Nov 2017 06:50 )             39.9     11-02    142  |  109<H>  |  9   ----------------------------<  103<H>  3.8   |  27  |  0.85    Ca    9.1      02 Nov 2017 06:50    TPro  8.0  /  Alb  4.1  /  TBili  0.5  /  DBili  x   /  AST  15  /  ALT  21  /  AlkPhos  105  10-31    PT/INR - ( 31 Oct 2017 19:41 )   PT: 11.5 sec;   INR: 1.06 ratio    PTT - ( 31 Oct 2017 19:41 )  PTT:33.0 sec    All images reviewed.   CT Abdomen and Pelvis w/ IV Cont (10.31.17 @ 22:08) >  IMPRESSION:   Likely duodenitis, however, correlation with pancreatic enzymes is   recommended to exclude pancreatitis as etiology for the findings.       CT Head No Cont (10.31.17 @ 22:05) >  Stable exam. No acute intracranial hemorrhage, mass effect or evidence of   acute territorial infarct.    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   - no diarrheal symptoms and currently afebrile.   - s/p 1.5L IVFs and IV Zosyn.   - stop further antibiotics as leukocytosis likely reactive in nature. RESOLVED.   - appreciate GI input.       # Headache - mild in the setting of illness.   - stable Head CT .  - cont w/ Tylenol prn.     # Microscopic Hematuria - noted in UA, likely 2/2 illness.   - recommend outpatient repeat in 1-2 weeks.  DC home today.   Discussed w/ staff and patient.   Total time > 45 mins.

## 2017-11-06 DIAGNOSIS — R10.9 UNSPECIFIED ABDOMINAL PAIN: ICD-10-CM

## 2017-11-06 DIAGNOSIS — R31.29 OTHER MICROSCOPIC HEMATURIA: ICD-10-CM

## 2017-11-06 DIAGNOSIS — R11.2 NAUSEA WITH VOMITING, UNSPECIFIED: ICD-10-CM

## 2017-11-06 DIAGNOSIS — K29.80 DUODENITIS WITHOUT BLEEDING: ICD-10-CM

## 2017-11-06 DIAGNOSIS — R51 HEADACHE: ICD-10-CM

## 2017-11-11 ENCOUNTER — APPOINTMENT (OUTPATIENT)
Dept: ULTRASOUND IMAGING | Facility: CLINIC | Age: 37
End: 2017-11-11
Payer: MEDICAID

## 2017-11-11 ENCOUNTER — OUTPATIENT (OUTPATIENT)
Dept: OUTPATIENT SERVICES | Facility: HOSPITAL | Age: 37
LOS: 1 days | End: 2017-11-11
Payer: COMMERCIAL

## 2017-11-11 DIAGNOSIS — Z00.8 ENCOUNTER FOR OTHER GENERAL EXAMINATION: ICD-10-CM

## 2017-11-11 DIAGNOSIS — Z98.51 TUBAL LIGATION STATUS: Chronic | ICD-10-CM

## 2017-11-11 PROCEDURE — 76830 TRANSVAGINAL US NON-OB: CPT

## 2017-11-11 PROCEDURE — 76856 US EXAM PELVIC COMPLETE: CPT

## 2017-11-11 PROCEDURE — 76856 US EXAM PELVIC COMPLETE: CPT | Mod: 26

## 2017-11-11 PROCEDURE — 76830 TRANSVAGINAL US NON-OB: CPT | Mod: 26

## 2017-12-26 ENCOUNTER — APPOINTMENT (OUTPATIENT)
Dept: ULTRASOUND IMAGING | Facility: CLINIC | Age: 37
End: 2017-12-26
Payer: MEDICAID

## 2017-12-26 ENCOUNTER — OUTPATIENT (OUTPATIENT)
Dept: OUTPATIENT SERVICES | Facility: HOSPITAL | Age: 37
LOS: 1 days | End: 2017-12-26
Payer: MEDICAID

## 2017-12-26 DIAGNOSIS — Z98.51 TUBAL LIGATION STATUS: Chronic | ICD-10-CM

## 2017-12-26 DIAGNOSIS — Z00.8 ENCOUNTER FOR OTHER GENERAL EXAMINATION: ICD-10-CM

## 2017-12-26 PROCEDURE — 76831 ECHO EXAM UTERUS: CPT

## 2017-12-26 PROCEDURE — 76831 ECHO EXAM UTERUS: CPT | Mod: 26

## 2017-12-26 PROCEDURE — 58340 CATHETER FOR HYSTEROGRAPHY: CPT

## 2018-01-18 ENCOUNTER — APPOINTMENT (OUTPATIENT)
Dept: OBGYN | Facility: CLINIC | Age: 38
End: 2018-01-18
Payer: MEDICAID

## 2018-01-18 VITALS
HEIGHT: 65 IN | WEIGHT: 200 LBS | SYSTOLIC BLOOD PRESSURE: 122 MMHG | DIASTOLIC BLOOD PRESSURE: 80 MMHG | BODY MASS INDEX: 33.32 KG/M2

## 2018-01-18 PROCEDURE — 99203 OFFICE O/P NEW LOW 30 MIN: CPT

## 2018-01-27 ENCOUNTER — EMERGENCY (EMERGENCY)
Facility: HOSPITAL | Age: 38
LOS: 0 days | Discharge: HOME | End: 2018-01-27
Attending: EMERGENCY MEDICINE | Admitting: EMERGENCY MEDICINE
Payer: MEDICAID

## 2018-01-27 VITALS
RESPIRATION RATE: 16 BRPM | DIASTOLIC BLOOD PRESSURE: 85 MMHG | OXYGEN SATURATION: 100 % | TEMPERATURE: 98 F | SYSTOLIC BLOOD PRESSURE: 128 MMHG | HEART RATE: 95 BPM

## 2018-01-27 VITALS — WEIGHT: 154.98 LBS

## 2018-01-27 DIAGNOSIS — K76.0 FATTY (CHANGE OF) LIVER, NOT ELSEWHERE CLASSIFIED: ICD-10-CM

## 2018-01-27 DIAGNOSIS — R31.9 HEMATURIA, UNSPECIFIED: ICD-10-CM

## 2018-01-27 DIAGNOSIS — Z98.51 TUBAL LIGATION STATUS: Chronic | ICD-10-CM

## 2018-01-27 DIAGNOSIS — N30.01 ACUTE CYSTITIS WITH HEMATURIA: ICD-10-CM

## 2018-01-27 DIAGNOSIS — R10.32 LEFT LOWER QUADRANT PAIN: ICD-10-CM

## 2018-01-27 LAB
ALBUMIN SERPL ELPH-MCNC: 3.6 G/DL — SIGNIFICANT CHANGE UP (ref 3.3–5)
ALP SERPL-CCNC: 107 U/L — SIGNIFICANT CHANGE UP (ref 40–120)
ALT FLD-CCNC: 20 U/L — SIGNIFICANT CHANGE UP (ref 12–78)
ANION GAP SERPL CALC-SCNC: 8 MMOL/L — SIGNIFICANT CHANGE UP (ref 5–17)
APPEARANCE UR: CLEAR — SIGNIFICANT CHANGE UP
AST SERPL-CCNC: 14 U/L — LOW (ref 15–37)
BASOPHILS # BLD AUTO: 0.1 K/UL — SIGNIFICANT CHANGE UP (ref 0–0.2)
BASOPHILS NFR BLD AUTO: 0.7 % — SIGNIFICANT CHANGE UP (ref 0–2)
BILIRUB SERPL-MCNC: 0.6 MG/DL — SIGNIFICANT CHANGE UP (ref 0.2–1.2)
BILIRUB UR-MCNC: NEGATIVE — SIGNIFICANT CHANGE UP
BUN SERPL-MCNC: 10 MG/DL — SIGNIFICANT CHANGE UP (ref 7–23)
CALCIUM SERPL-MCNC: 9 MG/DL — SIGNIFICANT CHANGE UP (ref 8.5–10.1)
CHLORIDE SERPL-SCNC: 107 MMOL/L — SIGNIFICANT CHANGE UP (ref 96–108)
CO2 SERPL-SCNC: 26 MMOL/L — SIGNIFICANT CHANGE UP (ref 22–31)
COLOR SPEC: YELLOW — SIGNIFICANT CHANGE UP
CREAT SERPL-MCNC: 0.79 MG/DL — SIGNIFICANT CHANGE UP (ref 0.5–1.3)
DIFF PNL FLD: (no result)
EOSINOPHIL # BLD AUTO: 0.2 K/UL — SIGNIFICANT CHANGE UP (ref 0–0.5)
EOSINOPHIL NFR BLD AUTO: 1.2 % — SIGNIFICANT CHANGE UP (ref 0–6)
GLUCOSE SERPL-MCNC: 102 MG/DL — HIGH (ref 70–99)
GLUCOSE UR QL: NEGATIVE MG/DL — SIGNIFICANT CHANGE UP
HCT VFR BLD CALC: 39 % — SIGNIFICANT CHANGE UP (ref 34.5–45)
HGB BLD-MCNC: 12.6 G/DL — SIGNIFICANT CHANGE UP (ref 11.5–15.5)
INR BLD: 1.16 RATIO — SIGNIFICANT CHANGE UP (ref 0.88–1.16)
KETONES UR-MCNC: NEGATIVE — SIGNIFICANT CHANGE UP
LEUKOCYTE ESTERASE UR-ACNC: (no result)
LYMPHOCYTES # BLD AUTO: 14.4 % — SIGNIFICANT CHANGE UP (ref 13–44)
LYMPHOCYTES # BLD AUTO: 2.1 K/UL — SIGNIFICANT CHANGE UP (ref 1–3.3)
MCHC RBC-ENTMCNC: 26 PG — LOW (ref 27–34)
MCHC RBC-ENTMCNC: 32.2 GM/DL — SIGNIFICANT CHANGE UP (ref 32–36)
MCV RBC AUTO: 80.7 FL — SIGNIFICANT CHANGE UP (ref 80–100)
MONOCYTES # BLD AUTO: 0.9 K/UL — SIGNIFICANT CHANGE UP (ref 0–0.9)
MONOCYTES NFR BLD AUTO: 6.1 % — SIGNIFICANT CHANGE UP (ref 2–14)
NEUTROPHILS # BLD AUTO: 11.3 K/UL — HIGH (ref 1.8–7.4)
NEUTROPHILS NFR BLD AUTO: 77.6 % — HIGH (ref 43–77)
NITRITE UR-MCNC: NEGATIVE — SIGNIFICANT CHANGE UP
PH UR: 6 — SIGNIFICANT CHANGE UP (ref 5–8)
PLATELET # BLD AUTO: 354 K/UL — SIGNIFICANT CHANGE UP (ref 150–400)
POTASSIUM SERPL-MCNC: 3.5 MMOL/L — SIGNIFICANT CHANGE UP (ref 3.5–5.3)
POTASSIUM SERPL-SCNC: 3.5 MMOL/L — SIGNIFICANT CHANGE UP (ref 3.5–5.3)
PROT SERPL-MCNC: 7.6 GM/DL — SIGNIFICANT CHANGE UP (ref 6–8.3)
PROT UR-MCNC: 30 MG/DL
PROTHROM AB SERPL-ACNC: 12.6 SEC — SIGNIFICANT CHANGE UP (ref 9.8–12.7)
RBC # BLD: 4.83 M/UL — SIGNIFICANT CHANGE UP (ref 3.8–5.2)
RBC # FLD: 13.8 % — SIGNIFICANT CHANGE UP (ref 10.3–14.5)
RBC CASTS # UR COMP ASSIST: >50 /HPF (ref 0–4)
SODIUM SERPL-SCNC: 141 MMOL/L — SIGNIFICANT CHANGE UP (ref 135–145)
SP GR SPEC: 1 — LOW (ref 1.01–1.02)
UROBILINOGEN FLD QL: NEGATIVE MG/DL — SIGNIFICANT CHANGE UP
WBC # BLD: 14.5 K/UL — HIGH (ref 3.8–10.5)
WBC # FLD AUTO: 14.5 K/UL — HIGH (ref 3.8–10.5)
WBC UR QL: >50

## 2018-01-27 PROCEDURE — 74176 CT ABD & PELVIS W/O CONTRAST: CPT | Mod: 26

## 2018-01-27 PROCEDURE — 99284 EMERGENCY DEPT VISIT MOD MDM: CPT

## 2018-01-27 RX ORDER — NITROFURANTOIN MACROCRYSTAL 50 MG
1 CAPSULE ORAL
Qty: 14 | Refills: 0 | OUTPATIENT
Start: 2018-01-27 | End: 2018-02-02

## 2018-01-27 RX ORDER — NITROFURANTOIN MACROCRYSTAL 50 MG
100 CAPSULE ORAL ONCE
Qty: 0 | Refills: 0 | Status: COMPLETED | OUTPATIENT
Start: 2018-01-27 | End: 2018-01-27

## 2018-01-27 RX ADMIN — Medication 100 MILLIGRAM(S): at 13:21

## 2018-01-27 NOTE — ED STATDOCS - PROGRESS NOTE DETAILS
signed Sindi Leyva PA-C Pt seen initially in intake by Dr Babb. Pt declines  services.   pt c/o left flank pain, dysuria, and hematuria since last night. Denies hx of kidney stones. PMD Karlene. Plan UA, cx, CT. Pt agrees with plan of  care. Pt alert, NAD. signed Sindi Leyva PA-C  ID [id number].   Discussed results of labs and CT in detail with pt. +uti, elevated WBC. discussed fatty liver with pt. advised to f/u with PMD and GI. pt given copy of labwork and imaging results. Pt feeling well, pt and family agree with DC and plan of care.

## 2018-01-27 NOTE — ED STATDOCS - OBJECTIVE STATEMENT
36 y/o female with PMHx of Gastritis, tubal ligation presents to the ED complaining of abdominal pain and hematuria ,let back pain since last night. Pt denies fever, fall or trauma. Pt had these Sx last year. No hx of Kidney stones. PMD

## 2018-01-27 NOTE — ED ADULT TRIAGE NOTE - CHIEF COMPLAINT QUOTE
pt c.o abdominal pain and hematuria  with UTI symptoms since yesterday. pt denies fever, fall or trauma

## 2018-01-27 NOTE — ED STATDOCS - GASTROINTESTINAL, MLM
+LLQ tenderness to palpitation, no CVA tenderness. abdomen soft, non-tender, and non-distended. Bowel sounds present.

## 2018-01-27 NOTE — ED STATDOCS - MEDICAL DECISION MAKING DETAILS
uti, outpt f/u rx macrobid. f/u GI for fatty liver. uti, no signs of kidney stone. outpt f/u rx macrobid. f/u GI for fatty liver.

## 2018-01-30 NOTE — ED POST DISCHARGE NOTE - RESULT SUMMARY
Urine culture with 10-49,000 E.coli  sensitive to Macrobid.  Pt. prescribed Macrobid.  KANDY Zhao PA-C

## 2018-03-15 ENCOUNTER — APPOINTMENT (OUTPATIENT)
Dept: OBGYN | Facility: CLINIC | Age: 38
End: 2018-03-15

## 2018-03-20 ENCOUNTER — APPOINTMENT (OUTPATIENT)
Dept: OBGYN | Facility: HOSPITAL | Age: 38
End: 2018-03-20

## 2018-03-29 ENCOUNTER — APPOINTMENT (OUTPATIENT)
Dept: OBGYN | Facility: CLINIC | Age: 38
End: 2018-03-29

## 2018-05-03 ENCOUNTER — APPOINTMENT (OUTPATIENT)
Dept: OBGYN | Facility: CLINIC | Age: 38
End: 2018-05-03

## 2018-06-04 ENCOUNTER — OUTPATIENT (OUTPATIENT)
Dept: OUTPATIENT SERVICES | Facility: HOSPITAL | Age: 38
LOS: 1 days | Discharge: HOME | End: 2018-06-04

## 2018-06-04 VITALS
HEIGHT: 64 IN | WEIGHT: 199.08 LBS | RESPIRATION RATE: 16 BRPM | DIASTOLIC BLOOD PRESSURE: 72 MMHG | SYSTOLIC BLOOD PRESSURE: 123 MMHG | OXYGEN SATURATION: 98 % | HEART RATE: 82 BPM | TEMPERATURE: 98 F

## 2018-06-04 DIAGNOSIS — Z98.51 TUBAL LIGATION STATUS: Chronic | ICD-10-CM

## 2018-06-04 DIAGNOSIS — N85.00 ENDOMETRIAL HYPERPLASIA, UNSPECIFIED: ICD-10-CM

## 2018-06-04 DIAGNOSIS — Z41.9 ENCOUNTER FOR PROCEDURE FOR PURPOSES OTHER THAN REMEDYING HEALTH STATE, UNSPECIFIED: Chronic | ICD-10-CM

## 2018-06-04 LAB
ANION GAP SERPL CALC-SCNC: 6 MMOL/L — SIGNIFICANT CHANGE UP (ref 5–17)
APPEARANCE UR: CLEAR — SIGNIFICANT CHANGE UP
BASOPHILS # BLD AUTO: 0.06 K/UL — SIGNIFICANT CHANGE UP (ref 0–0.2)
BASOPHILS NFR BLD AUTO: 0.6 % — SIGNIFICANT CHANGE UP (ref 0–2)
BILIRUB UR-MCNC: NEGATIVE — SIGNIFICANT CHANGE UP
BLD GP AB SCN SERPL QL: SIGNIFICANT CHANGE UP
BUN SERPL-MCNC: 10 MG/DL — SIGNIFICANT CHANGE UP (ref 7–23)
CALCIUM SERPL-MCNC: 8.7 MG/DL — SIGNIFICANT CHANGE UP (ref 8.5–10.1)
CHLORIDE SERPL-SCNC: 111 MMOL/L — HIGH (ref 96–108)
CO2 SERPL-SCNC: 23 MMOL/L — SIGNIFICANT CHANGE UP (ref 22–31)
COLOR SPEC: YELLOW — SIGNIFICANT CHANGE UP
CREAT SERPL-MCNC: 0.8 MG/DL — SIGNIFICANT CHANGE UP (ref 0.5–1.3)
DIFF PNL FLD: ABNORMAL
EOSINOPHIL # BLD AUTO: 0.23 K/UL — SIGNIFICANT CHANGE UP (ref 0–0.5)
EOSINOPHIL NFR BLD AUTO: 2.3 % — SIGNIFICANT CHANGE UP (ref 0–6)
EPI CELLS # UR: SIGNIFICANT CHANGE UP
GLUCOSE SERPL-MCNC: 105 MG/DL — HIGH (ref 70–99)
GLUCOSE UR QL: NEGATIVE MG/DL — SIGNIFICANT CHANGE UP
HCT VFR BLD CALC: 39.7 % — SIGNIFICANT CHANGE UP (ref 34.5–45)
HGB BLD-MCNC: 12.9 G/DL — SIGNIFICANT CHANGE UP (ref 11.5–15.5)
IMM GRANULOCYTES NFR BLD AUTO: 0.7 % — SIGNIFICANT CHANGE UP (ref 0–1.5)
KETONES UR-MCNC: NEGATIVE — SIGNIFICANT CHANGE UP
LEUKOCYTE ESTERASE UR-ACNC: NEGATIVE — SIGNIFICANT CHANGE UP
LYMPHOCYTES # BLD AUTO: 2.57 K/UL — SIGNIFICANT CHANGE UP (ref 1–3.3)
LYMPHOCYTES # BLD AUTO: 25.7 % — SIGNIFICANT CHANGE UP (ref 13–44)
MCHC RBC-ENTMCNC: 26.8 PG — LOW (ref 27–34)
MCHC RBC-ENTMCNC: 32.5 GM/DL — SIGNIFICANT CHANGE UP (ref 32–36)
MCV RBC AUTO: 82.4 FL — SIGNIFICANT CHANGE UP (ref 80–100)
MONOCYTES # BLD AUTO: 0.66 K/UL — SIGNIFICANT CHANGE UP (ref 0–0.9)
MONOCYTES NFR BLD AUTO: 6.6 % — SIGNIFICANT CHANGE UP (ref 2–14)
NEUTROPHILS # BLD AUTO: 6.41 K/UL — SIGNIFICANT CHANGE UP (ref 1.8–7.4)
NEUTROPHILS NFR BLD AUTO: 64.1 % — SIGNIFICANT CHANGE UP (ref 43–77)
NITRITE UR-MCNC: NEGATIVE — SIGNIFICANT CHANGE UP
NRBC # BLD: 0 /100 WBCS — SIGNIFICANT CHANGE UP (ref 0–0)
PH UR: 5 — SIGNIFICANT CHANGE UP (ref 5–8)
PLATELET # BLD AUTO: 334 K/UL — SIGNIFICANT CHANGE UP (ref 150–400)
POTASSIUM SERPL-MCNC: 3.9 MMOL/L — SIGNIFICANT CHANGE UP (ref 3.5–5.3)
POTASSIUM SERPL-SCNC: 3.9 MMOL/L — SIGNIFICANT CHANGE UP (ref 3.5–5.3)
PROT UR-MCNC: NEGATIVE MG/DL — SIGNIFICANT CHANGE UP
RBC # BLD: 4.82 M/UL — SIGNIFICANT CHANGE UP (ref 3.8–5.2)
RBC # FLD: 15.1 % — HIGH (ref 10.3–14.5)
RBC CASTS # UR COMP ASSIST: ABNORMAL /HPF (ref 0–4)
SODIUM SERPL-SCNC: 140 MMOL/L — SIGNIFICANT CHANGE UP (ref 135–145)
SP GR SPEC: 1.02 — SIGNIFICANT CHANGE UP (ref 1.01–1.02)
TYPE + AB SCN PNL BLD: SIGNIFICANT CHANGE UP
UROBILINOGEN FLD QL: NEGATIVE MG/DL — SIGNIFICANT CHANGE UP
WBC # BLD: 10 K/UL — SIGNIFICANT CHANGE UP (ref 3.8–10.5)
WBC # FLD AUTO: 10 K/UL — SIGNIFICANT CHANGE UP (ref 3.8–10.5)
WBC UR QL: SIGNIFICANT CHANGE UP

## 2018-06-04 RX ORDER — OMEPRAZOLE 10 MG/1
0 CAPSULE, DELAYED RELEASE ORAL
Qty: 0 | Refills: 0 | COMMUNITY

## 2018-06-04 NOTE — H&P PST ADULT - PMH
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

## 2018-06-04 NOTE — H&P PST ADULT - HISTORY OF PRESENT ILLNESS
38 year old female PMH of gastritis, GERD c/o  menorrhagia work up done sonogram showed uterine polyp presents to [facility] for diagnostic hysteroscopy D&C polypectomy

## 2018-06-04 NOTE — H&P PST ADULT - NSANTHOSAYNRD_GEN_A_CORE
No. DOMINGO screening performed.  STOP BANG Legend: 0-2 = LOW Risk; 3-4 = INTERMEDIATE Risk; 5-8 = HIGH Risk

## 2018-06-04 NOTE — H&P PST ADULT - ASSESSMENT
38 year old female presents to PST for diagnostic hysteroscopy D&C polypectomy  1. PST instructions given ; NPO post midnight   2. Pt instructed to take following meds with sip of water : omeprazole   3. urine for pregnancy on day of surgery

## 2018-06-06 ENCOUNTER — OTHER (OUTPATIENT)
Age: 38
End: 2018-06-06

## 2018-06-07 ENCOUNTER — APPOINTMENT (OUTPATIENT)
Dept: OBGYN | Facility: CLINIC | Age: 38
End: 2018-06-07
Payer: SELF-PAY

## 2018-06-07 DIAGNOSIS — N93.9 ABNORMAL UTERINE AND VAGINAL BLEEDING, UNSPECIFIED: ICD-10-CM

## 2018-06-07 DIAGNOSIS — R93.8 ABNORMAL FINDINGS ON DIAGNOSTIC IMAGING OF OTHER SPECIFIED BODY STRUCTURES: ICD-10-CM

## 2018-06-07 PROCEDURE — 99213 OFFICE O/P EST LOW 20 MIN: CPT

## 2018-06-09 PROBLEM — R93.8 THICKENED ENDOMETRIUM: Status: ACTIVE | Noted: 2018-01-22

## 2018-06-09 PROBLEM — N93.9 ABNORMAL UTERINE BLEEDING (AUB): Status: ACTIVE | Noted: 2018-01-22

## 2018-06-12 ENCOUNTER — APPOINTMENT (OUTPATIENT)
Dept: OBGYN | Facility: HOSPITAL | Age: 38
End: 2018-06-12

## 2018-06-12 ENCOUNTER — RESULT REVIEW (OUTPATIENT)
Age: 38
End: 2018-06-12

## 2018-06-12 ENCOUNTER — OUTPATIENT (OUTPATIENT)
Dept: OUTPATIENT SERVICES | Facility: HOSPITAL | Age: 38
LOS: 1 days | Discharge: HOME | End: 2018-06-12
Payer: COMMERCIAL

## 2018-06-12 VITALS
TEMPERATURE: 99 F | DIASTOLIC BLOOD PRESSURE: 66 MMHG | OXYGEN SATURATION: 97 % | HEIGHT: 64 IN | WEIGHT: 199.08 LBS | RESPIRATION RATE: 14 BRPM | HEART RATE: 80 BPM | SYSTOLIC BLOOD PRESSURE: 108 MMHG

## 2018-06-12 VITALS
HEART RATE: 71 BPM | SYSTOLIC BLOOD PRESSURE: 109 MMHG | RESPIRATION RATE: 18 BRPM | OXYGEN SATURATION: 98 % | DIASTOLIC BLOOD PRESSURE: 61 MMHG

## 2018-06-12 DIAGNOSIS — Z98.51 TUBAL LIGATION STATUS: Chronic | ICD-10-CM

## 2018-06-12 DIAGNOSIS — Z41.9 ENCOUNTER FOR PROCEDURE FOR PURPOSES OTHER THAN REMEDYING HEALTH STATE, UNSPECIFIED: Chronic | ICD-10-CM

## 2018-06-12 LAB — HCG UR QL: NEGATIVE — SIGNIFICANT CHANGE UP

## 2018-06-12 PROCEDURE — 88305 TISSUE EXAM BY PATHOLOGIST: CPT | Mod: 26

## 2018-06-12 PROCEDURE — 58558 HYSTEROSCOPY BIOPSY: CPT

## 2018-06-12 RX ORDER — OXYCODONE HYDROCHLORIDE 5 MG/1
10 TABLET ORAL EVERY 6 HOURS
Qty: 0 | Refills: 0 | Status: DISCONTINUED | OUTPATIENT
Start: 2018-06-12 | End: 2018-06-12

## 2018-06-12 RX ORDER — SODIUM CHLORIDE 9 MG/ML
1000 INJECTION, SOLUTION INTRAVENOUS
Qty: 0 | Refills: 0 | Status: DISCONTINUED | OUTPATIENT
Start: 2018-06-12 | End: 2018-06-12

## 2018-06-12 RX ORDER — SODIUM CHLORIDE 9 MG/ML
1000 INJECTION, SOLUTION INTRAVENOUS
Qty: 0 | Refills: 0 | Status: DISCONTINUED | OUTPATIENT
Start: 2018-06-12 | End: 2018-06-27

## 2018-06-12 RX ORDER — OMEPRAZOLE 10 MG/1
1 CAPSULE, DELAYED RELEASE ORAL
Qty: 0 | Refills: 0 | COMMUNITY

## 2018-06-12 RX ORDER — ACETAMINOPHEN 500 MG
2 TABLET ORAL
Qty: 20 | Refills: 0 | OUTPATIENT
Start: 2018-06-12

## 2018-06-12 RX ORDER — FENTANYL CITRATE 50 UG/ML
50 INJECTION INTRAVENOUS
Qty: 0 | Refills: 0 | Status: DISCONTINUED | OUTPATIENT
Start: 2018-06-12 | End: 2018-06-12

## 2018-06-12 RX ORDER — IBUPROFEN 200 MG
1 TABLET ORAL
Qty: 20 | Refills: 0 | OUTPATIENT
Start: 2018-06-12

## 2018-06-12 RX ORDER — ONDANSETRON 8 MG/1
4 TABLET, FILM COATED ORAL ONCE
Qty: 0 | Refills: 0 | Status: DISCONTINUED | OUTPATIENT
Start: 2018-06-12 | End: 2018-06-12

## 2018-06-12 NOTE — ASU DISCHARGE PLAN (ADULT/PEDIATRIC). - MEDICATION SUMMARY - MEDICATIONS TO TAKE
I will START or STAY ON the medications listed below when I get home from the hospital:     mg oral tablet  -- 1 tab(s) by mouth every 6 hours   -- Do not take this drug if you are pregnant.  It is very important that you take or use this exactly as directed.  Do not skip doses or discontinue unless directed by your doctor.  May cause drowsiness or dizziness.  Obtain medical advice before taking any non-prescription drugs as some may affect the action of this medication.  Take with food or milk.    -- Indication: For postoperative pain    Tylenol 325 mg oral tablet  -- 2 tab(s) by mouth every 4 hours   -- This product contains acetaminophen.  Do not use  with any other product containing acetaminophen to prevent possible liver damage.    -- Indication: For postoperative pain

## 2018-06-12 NOTE — BRIEF OPERATIVE NOTE - POST-OP DX
Abnormal uterine bleeding (AUB)  06/12/2018    Active  Catrachito Phelps  Endometrial polyp  06/12/2018    Active  Catrachito Phelps

## 2018-06-12 NOTE — PROVIDER CONTACT NOTE (OTHER) - SITUATION
Patient presented with little to no pain, after walking to the bathroom, she got back to bed C/o B/L calf pain, firm and tender to the touch.  melindaies CINTHIA HARMAN.

## 2018-06-12 NOTE — BRIEF OPERATIVE NOTE - PROCEDURE
<<-----Click on this checkbox to enter Procedure Dilatation & curettage  06/12/2018    Active  [email or web address]  Hysteroscopic polypectomy using MyoSure tissue removal system  06/12/2018    Active  [email or web address]

## 2018-06-13 LAB — SURGICAL PATHOLOGY FINAL REPORT - CH: SIGNIFICANT CHANGE UP

## 2018-06-18 DIAGNOSIS — K29.70 GASTRITIS, UNSPECIFIED, WITHOUT BLEEDING: ICD-10-CM

## 2018-06-18 DIAGNOSIS — N85.8 OTHER SPECIFIED NONINFLAMMATORY DISORDERS OF UTERUS: ICD-10-CM

## 2018-06-18 DIAGNOSIS — K44.9 DIAPHRAGMATIC HERNIA WITHOUT OBSTRUCTION OR GANGRENE: ICD-10-CM

## 2018-06-18 DIAGNOSIS — N84.0 POLYP OF CORPUS UTERI: ICD-10-CM

## 2018-06-18 DIAGNOSIS — K21.9 GASTRO-ESOPHAGEAL REFLUX DISEASE WITHOUT ESOPHAGITIS: ICD-10-CM

## 2018-06-28 ENCOUNTER — APPOINTMENT (OUTPATIENT)
Dept: OBGYN | Facility: CLINIC | Age: 38
End: 2018-06-28

## 2018-06-28 VITALS
HEIGHT: 65 IN | DIASTOLIC BLOOD PRESSURE: 80 MMHG | SYSTOLIC BLOOD PRESSURE: 108 MMHG | RESPIRATION RATE: 16 BRPM | TEMPERATURE: 98.6 F | BODY MASS INDEX: 31.65 KG/M2 | WEIGHT: 190 LBS

## 2018-09-24 PROBLEM — S03.00XA DISLOCATION OF JAW, UNSPECIFIED SIDE, INITIAL ENCOUNTER: Chronic | Status: ACTIVE | Noted: 2018-06-04

## 2018-09-24 PROBLEM — K21.9 GASTRO-ESOPHAGEAL REFLUX DISEASE WITHOUT ESOPHAGITIS: Chronic | Status: ACTIVE | Noted: 2018-06-04

## 2018-10-01 ENCOUNTER — APPOINTMENT (OUTPATIENT)
Dept: ULTRASOUND IMAGING | Facility: CLINIC | Age: 38
End: 2018-10-01
Payer: COMMERCIAL

## 2018-10-01 ENCOUNTER — OUTPATIENT (OUTPATIENT)
Dept: OUTPATIENT SERVICES | Facility: HOSPITAL | Age: 38
LOS: 1 days | End: 2018-10-01
Payer: COMMERCIAL

## 2018-10-01 DIAGNOSIS — Z41.9 ENCOUNTER FOR PROCEDURE FOR PURPOSES OTHER THAN REMEDYING HEALTH STATE, UNSPECIFIED: Chronic | ICD-10-CM

## 2018-10-01 DIAGNOSIS — Z00.8 ENCOUNTER FOR OTHER GENERAL EXAMINATION: ICD-10-CM

## 2018-10-01 DIAGNOSIS — Z98.51 TUBAL LIGATION STATUS: Chronic | ICD-10-CM

## 2018-10-01 PROCEDURE — 76536 US EXAM OF HEAD AND NECK: CPT

## 2018-10-01 PROCEDURE — 76536 US EXAM OF HEAD AND NECK: CPT | Mod: 26

## 2018-10-22 ENCOUNTER — RESULT REVIEW (OUTPATIENT)
Age: 38
End: 2018-10-22

## 2019-09-30 ENCOUNTER — RESULT REVIEW (OUTPATIENT)
Age: 39
End: 2019-09-30

## 2020-02-10 ENCOUNTER — EMERGENCY (EMERGENCY)
Facility: HOSPITAL | Age: 40
LOS: 0 days | Discharge: HOME | End: 2020-02-10
Attending: EMERGENCY MEDICINE
Payer: COMMERCIAL

## 2020-02-10 VITALS
RESPIRATION RATE: 16 BRPM | OXYGEN SATURATION: 100 % | HEART RATE: 68 BPM | SYSTOLIC BLOOD PRESSURE: 128 MMHG | DIASTOLIC BLOOD PRESSURE: 76 MMHG | TEMPERATURE: 98 F

## 2020-02-10 VITALS — WEIGHT: 182.98 LBS | HEIGHT: 65 IN

## 2020-02-10 DIAGNOSIS — Z41.9 ENCOUNTER FOR PROCEDURE FOR PURPOSES OTHER THAN REMEDYING HEALTH STATE, UNSPECIFIED: Chronic | ICD-10-CM

## 2020-02-10 DIAGNOSIS — M79.604 PAIN IN RIGHT LEG: ICD-10-CM

## 2020-02-10 DIAGNOSIS — K21.9 GASTRO-ESOPHAGEAL REFLUX DISEASE WITHOUT ESOPHAGITIS: ICD-10-CM

## 2020-02-10 DIAGNOSIS — Z98.51 TUBAL LIGATION STATUS: Chronic | ICD-10-CM

## 2020-02-10 DIAGNOSIS — R10.31 RIGHT LOWER QUADRANT PAIN: ICD-10-CM

## 2020-02-10 DIAGNOSIS — R31.9 HEMATURIA, UNSPECIFIED: ICD-10-CM
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
## 2020-02-10 LAB
APPEARANCE UR: CLEAR — SIGNIFICANT CHANGE UP
BASOPHILS # BLD AUTO: 0.07 K/UL — SIGNIFICANT CHANGE UP (ref 0–0.2)
BASOPHILS NFR BLD AUTO: 0.6 % — SIGNIFICANT CHANGE UP (ref 0–2)
BILIRUB UR-MCNC: NEGATIVE — SIGNIFICANT CHANGE UP
COLOR SPEC: YELLOW — SIGNIFICANT CHANGE UP
DIFF PNL FLD: ABNORMAL
EOSINOPHIL # BLD AUTO: 0.13 K/UL — SIGNIFICANT CHANGE UP (ref 0–0.5)
EOSINOPHIL NFR BLD AUTO: 1.1 % — SIGNIFICANT CHANGE UP (ref 0–6)
GLUCOSE UR QL: NEGATIVE MG/DL — SIGNIFICANT CHANGE UP
HCT VFR BLD CALC: 41.7 % — SIGNIFICANT CHANGE UP (ref 34.5–45)
HGB BLD-MCNC: 13.5 G/DL — SIGNIFICANT CHANGE UP (ref 11.5–15.5)
IMM GRANULOCYTES NFR BLD AUTO: 0.4 % — SIGNIFICANT CHANGE UP (ref 0–1.5)
KETONES UR-MCNC: NEGATIVE — SIGNIFICANT CHANGE UP
LEUKOCYTE ESTERASE UR-ACNC: NEGATIVE — SIGNIFICANT CHANGE UP
LYMPHOCYTES # BLD AUTO: 2.79 K/UL — SIGNIFICANT CHANGE UP (ref 1–3.3)
LYMPHOCYTES # BLD AUTO: 24.6 % — SIGNIFICANT CHANGE UP (ref 13–44)
MCHC RBC-ENTMCNC: 27.2 PG — SIGNIFICANT CHANGE UP (ref 27–34)
MCHC RBC-ENTMCNC: 32.4 GM/DL — SIGNIFICANT CHANGE UP (ref 32–36)
MCV RBC AUTO: 83.9 FL — SIGNIFICANT CHANGE UP (ref 80–100)
MONOCYTES # BLD AUTO: 0.71 K/UL — SIGNIFICANT CHANGE UP (ref 0–0.9)
MONOCYTES NFR BLD AUTO: 6.3 % — SIGNIFICANT CHANGE UP (ref 2–14)
NEUTROPHILS # BLD AUTO: 7.61 K/UL — HIGH (ref 1.8–7.4)
NEUTROPHILS NFR BLD AUTO: 67 % — SIGNIFICANT CHANGE UP (ref 43–77)
NITRITE UR-MCNC: NEGATIVE — SIGNIFICANT CHANGE UP
PH UR: 6 — SIGNIFICANT CHANGE UP (ref 5–8)
PLATELET # BLD AUTO: 370 K/UL — SIGNIFICANT CHANGE UP (ref 150–400)
PROT UR-MCNC: NEGATIVE MG/DL — SIGNIFICANT CHANGE UP
RBC # BLD: 4.97 M/UL — SIGNIFICANT CHANGE UP (ref 3.8–5.2)
RBC # FLD: 14.5 % — SIGNIFICANT CHANGE UP (ref 10.3–14.5)
SP GR SPEC: 1.01 — SIGNIFICANT CHANGE UP (ref 1.01–1.02)
UROBILINOGEN FLD QL: NEGATIVE MG/DL — SIGNIFICANT CHANGE UP
WBC # BLD: 11.36 K/UL — HIGH (ref 3.8–10.5)
WBC # FLD AUTO: 11.36 K/UL — HIGH (ref 3.8–10.5)

## 2020-02-10 PROCEDURE — 80053 COMPREHEN METABOLIC PANEL: CPT

## 2020-02-10 PROCEDURE — 85025 COMPLETE CBC W/AUTO DIFF WBC: CPT

## 2020-02-10 PROCEDURE — 99284 EMERGENCY DEPT VISIT MOD MDM: CPT | Mod: 25

## 2020-02-10 PROCEDURE — 85730 THROMBOPLASTIN TIME PARTIAL: CPT

## 2020-02-10 PROCEDURE — 36415 COLL VENOUS BLD VENIPUNCTURE: CPT

## 2020-02-10 PROCEDURE — 84702 CHORIONIC GONADOTROPIN TEST: CPT

## 2020-02-10 PROCEDURE — 85610 PROTHROMBIN TIME: CPT

## 2020-02-10 PROCEDURE — 81001 URINALYSIS AUTO W/SCOPE: CPT

## 2020-02-10 PROCEDURE — 74177 CT ABD & PELVIS W/CONTRAST: CPT

## 2020-02-10 PROCEDURE — 99284 EMERGENCY DEPT VISIT MOD MDM: CPT

## 2020-02-10 PROCEDURE — 74177 CT ABD & PELVIS W/CONTRAST: CPT | Mod: 26

## 2020-02-10 NOTE — ED STATDOCS - PATIENT PORTAL LINK FT
You can access the FollowMyHealth Patient Portal offered by [facility] by registering at the following website: [email or web address]. By joining [employer]’s FollowMyHealth portal, you will also be able to view your health information using other applications (apps) compatible with our system.
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
## 2020-02-10 NOTE — ED STATDOCS - NEUROLOGICAL, MLM
sensation is normal and strength is normal. Normal gait. sensation is normal and strength is normal. Normal gait. No spinal ttp

## 2020-02-10 NOTE — ED STATDOCS - CARE PROVIDER_API CALL
Jeremias Swenson)  Urology  [address], [address] [place] [address]  Phone: [phone]  Fax: [phone]  Follow Up Time: 7-10 Days
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
## 2020-02-20 ENCOUNTER — APPOINTMENT (OUTPATIENT)
Dept: ULTRASOUND IMAGING | Facility: CLINIC | Age: 40
End: 2020-02-20
Payer: COMMERCIAL

## 2020-02-20 ENCOUNTER — OUTPATIENT (OUTPATIENT)
Dept: OUTPATIENT SERVICES | Facility: HOSPITAL | Age: 40
LOS: 1 days | End: 2020-02-20
Payer: COMMERCIAL

## 2020-02-20 DIAGNOSIS — Z00.8 ENCOUNTER FOR OTHER GENERAL EXAMINATION: ICD-10-CM

## 2020-02-20 DIAGNOSIS — Z98.51 TUBAL LIGATION STATUS: Chronic | ICD-10-CM

## 2020-02-20 DIAGNOSIS — Z41.9 ENCOUNTER FOR PROCEDURE FOR PURPOSES OTHER THAN REMEDYING HEALTH STATE, UNSPECIFIED: Chronic | ICD-10-CM

## 2020-02-20 PROCEDURE — 76830 TRANSVAGINAL US NON-OB: CPT

## 2020-02-20 PROCEDURE — 76830 TRANSVAGINAL US NON-OB: CPT | Mod: 26

## 2020-02-20 PROCEDURE — 76856 US EXAM PELVIC COMPLETE: CPT

## 2020-02-20 PROCEDURE — 76856 US EXAM PELVIC COMPLETE: CPT | Mod: 26

## 2020-03-03 ENCOUNTER — APPOINTMENT (OUTPATIENT)
Dept: ORTHOPEDIC SURGERY | Facility: CLINIC | Age: 40
End: 2020-03-03
Payer: COMMERCIAL

## 2020-03-03 DIAGNOSIS — M89.8X1 OTHER SPECIFIED DISORDERS OF BONE, SHOULDER: ICD-10-CM

## 2020-03-03 PROCEDURE — 73030 X-RAY EXAM OF SHOULDER: CPT | Mod: RT,TC

## 2020-03-03 PROCEDURE — 99203 OFFICE O/P NEW LOW 30 MIN: CPT

## 2020-03-03 NOTE — ASSESSMENT
[FreeTextEntry1] : Patient with right-sided periscapular pain. The nature of this condition was described at length with the patient she verbalized understanding. I recommend a followup with a physiatrist for further treatment of her upper back muscle pain. I will start the patient on meloxicam for an anti-inflammatory today. The patient is in agreement with this plan.
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
## 2020-03-03 NOTE — PHYSICAL EXAM
[Normal Finger/nose] : finger to nose coordination [Normal] : no peripheral adenopathy appreciated [de-identified] : ASPENR [de-identified] : Right Shoulder Exam\par \par Inspection: No malalignment, No defects\par Skin: No masses, No lesions\par Neck: Negative Spurlings, full ROM without pain\par ROM: Full range of motion of the right shoulder with forward flexion, abduction, internal and external rotation\par Painful arc ROM: None\par Tenderness: No bicipital tenderness, no tenderness to the greater tuberosity/RTC insertion, no anterior shoulder/lesser tuberosity tenderness. Positive periscapular tenderness.\par Strength: 5/5 ER, 5/5 IR in adduction, 5/5 supraspinatus testing\par AC Joint: No ttp, no pain with cross arm testing\par Biceps: Speed negative, Yergusons negative\par Impingement test: Negative Maynard\par Stability: Negative apprehension, negative anterior/posterior load and shift\par Vasc: 2+ radial pulse\par Neuro: AIN, PIN, Ulnar nerve in tact to motor\par Sensation: In tact to light touch throughout\par \par  [de-identified] : 3 x-ray views of the right shoulder were obtained. There are no fractures or dislocations noted.

## 2020-04-24 ENCOUNTER — EMERGENCY (EMERGENCY)
Facility: HOSPITAL | Age: 40
LOS: 0 days | Discharge: HOME | End: 2020-04-24
Attending: EMERGENCY MEDICINE
Payer: COMMERCIAL

## 2020-04-24 VITALS — WEIGHT: 190.04 LBS | HEIGHT: 62 IN

## 2020-04-24 VITALS
OXYGEN SATURATION: 98 % | SYSTOLIC BLOOD PRESSURE: 137 MMHG | HEART RATE: 78 BPM | RESPIRATION RATE: 17 BRPM | TEMPERATURE: 98 F | DIASTOLIC BLOOD PRESSURE: 66 MMHG

## 2020-04-24 DIAGNOSIS — B34.9 VIRAL INFECTION, UNSPECIFIED: ICD-10-CM

## 2020-04-24 DIAGNOSIS — J34.89 OTHER SPECIFIED DISORDERS OF NOSE AND NASAL SINUSES: ICD-10-CM

## 2020-04-24 DIAGNOSIS — M79.10 MYALGIA, UNSPECIFIED SITE: ICD-10-CM

## 2020-04-24 DIAGNOSIS — Z41.9 ENCOUNTER FOR PROCEDURE FOR PURPOSES OTHER THAN REMEDYING HEALTH STATE, UNSPECIFIED: Chronic | ICD-10-CM

## 2020-04-24 DIAGNOSIS — R07.9 CHEST PAIN, UNSPECIFIED: ICD-10-CM

## 2020-04-24 DIAGNOSIS — Z20.828 CONTACT WITH AND (SUSPECTED) EXPOSURE TO OTHER VIRAL COMMUNICABLE DISEASES: ICD-10-CM

## 2020-04-24 DIAGNOSIS — Z98.51 TUBAL LIGATION STATUS: Chronic | ICD-10-CM

## 2020-04-24 LAB
ALBUMIN SERPL ELPH-MCNC: 3.5 G/DL — SIGNIFICANT CHANGE UP (ref 3.3–5)
ALP SERPL-CCNC: 101 U/L — SIGNIFICANT CHANGE UP (ref 40–120)
ALT FLD-CCNC: 19 U/L — SIGNIFICANT CHANGE UP (ref 12–78)
ANION GAP SERPL CALC-SCNC: 7 MMOL/L — SIGNIFICANT CHANGE UP (ref 5–17)
APTT BLD: 28.8 SEC — SIGNIFICANT CHANGE UP (ref 27.5–36.3)
AST SERPL-CCNC: 16 U/L — SIGNIFICANT CHANGE UP (ref 15–37)
BASOPHILS # BLD AUTO: 0.06 K/UL — SIGNIFICANT CHANGE UP (ref 0–0.2)
BASOPHILS NFR BLD AUTO: 0.5 % — SIGNIFICANT CHANGE UP (ref 0–2)
BILIRUB SERPL-MCNC: 0.3 MG/DL — SIGNIFICANT CHANGE UP (ref 0.2–1.2)
BUN SERPL-MCNC: 14 MG/DL — SIGNIFICANT CHANGE UP (ref 7–23)
CALCIUM SERPL-MCNC: 8.9 MG/DL — SIGNIFICANT CHANGE UP (ref 8.5–10.1)
CHLORIDE SERPL-SCNC: 107 MMOL/L — SIGNIFICANT CHANGE UP (ref 96–108)
CO2 SERPL-SCNC: 26 MMOL/L — SIGNIFICANT CHANGE UP (ref 22–31)
CREAT SERPL-MCNC: 0.85 MG/DL — SIGNIFICANT CHANGE UP (ref 0.5–1.3)
D DIMER BLD IA.RAPID-MCNC: <150 NG/ML DDU — SIGNIFICANT CHANGE UP
EOSINOPHIL # BLD AUTO: 0.22 K/UL — SIGNIFICANT CHANGE UP (ref 0–0.5)
EOSINOPHIL NFR BLD AUTO: 1.9 % — SIGNIFICANT CHANGE UP (ref 0–6)
GLUCOSE SERPL-MCNC: 107 MG/DL — HIGH (ref 70–99)
HCT VFR BLD CALC: 37.6 % — SIGNIFICANT CHANGE UP (ref 34.5–45)
HGB BLD-MCNC: 12.3 G/DL — SIGNIFICANT CHANGE UP (ref 11.5–15.5)
IMM GRANULOCYTES NFR BLD AUTO: 0.2 % — SIGNIFICANT CHANGE UP (ref 0–1.5)
INR BLD: 1.13 RATIO — SIGNIFICANT CHANGE UP (ref 0.88–1.16)
LYMPHOCYTES # BLD AUTO: 3.78 K/UL — HIGH (ref 1–3.3)
LYMPHOCYTES # BLD AUTO: 33.4 % — SIGNIFICANT CHANGE UP (ref 13–44)
MCHC RBC-ENTMCNC: 27.2 PG — SIGNIFICANT CHANGE UP (ref 27–34)
MCHC RBC-ENTMCNC: 32.7 GM/DL — SIGNIFICANT CHANGE UP (ref 32–36)
MCV RBC AUTO: 83.2 FL — SIGNIFICANT CHANGE UP (ref 80–100)
MONOCYTES # BLD AUTO: 0.84 K/UL — SIGNIFICANT CHANGE UP (ref 0–0.9)
MONOCYTES NFR BLD AUTO: 7.4 % — SIGNIFICANT CHANGE UP (ref 2–14)
NEUTROPHILS # BLD AUTO: 6.41 K/UL — SIGNIFICANT CHANGE UP (ref 1.8–7.4)
NEUTROPHILS NFR BLD AUTO: 56.6 % — SIGNIFICANT CHANGE UP (ref 43–77)
PLATELET # BLD AUTO: 354 K/UL — SIGNIFICANT CHANGE UP (ref 150–400)
POTASSIUM SERPL-MCNC: 3.7 MMOL/L — SIGNIFICANT CHANGE UP (ref 3.5–5.3)
POTASSIUM SERPL-SCNC: 3.7 MMOL/L — SIGNIFICANT CHANGE UP (ref 3.5–5.3)
PROT SERPL-MCNC: 7.5 GM/DL — SIGNIFICANT CHANGE UP (ref 6–8.3)
PROTHROM AB SERPL-ACNC: 12.6 SEC — SIGNIFICANT CHANGE UP (ref 10–12.9)
RBC # BLD: 4.52 M/UL — SIGNIFICANT CHANGE UP (ref 3.8–5.2)
RBC # FLD: 14.1 % — SIGNIFICANT CHANGE UP (ref 10.3–14.5)
SARS-COV-2 RNA SPEC QL NAA+PROBE: SIGNIFICANT CHANGE UP
SODIUM SERPL-SCNC: 140 MMOL/L — SIGNIFICANT CHANGE UP (ref 135–145)
TROPONIN I SERPL-MCNC: <0.015 NG/ML — SIGNIFICANT CHANGE UP (ref 0.01–0.04)
WBC # BLD: 11.33 K/UL — HIGH (ref 3.8–10.5)
WBC # FLD AUTO: 11.33 K/UL — HIGH (ref 3.8–10.5)

## 2020-04-24 PROCEDURE — 99283 EMERGENCY DEPT VISIT LOW MDM: CPT

## 2020-04-24 PROCEDURE — 85610 PROTHROMBIN TIME: CPT

## 2020-04-24 PROCEDURE — 85379 FIBRIN DEGRADATION QUANT: CPT

## 2020-04-24 PROCEDURE — 99283 EMERGENCY DEPT VISIT LOW MDM: CPT | Mod: 25

## 2020-04-24 PROCEDURE — 36415 COLL VENOUS BLD VENIPUNCTURE: CPT

## 2020-04-24 PROCEDURE — 93010 ELECTROCARDIOGRAM REPORT: CPT

## 2020-04-24 PROCEDURE — 71045 X-RAY EXAM CHEST 1 VIEW: CPT | Mod: 26

## 2020-04-24 PROCEDURE — 87635 SARS-COV-2 COVID-19 AMP PRB: CPT

## 2020-04-24 PROCEDURE — 84484 ASSAY OF TROPONIN QUANT: CPT

## 2020-04-24 PROCEDURE — 85025 COMPLETE CBC W/AUTO DIFF WBC: CPT

## 2020-04-24 PROCEDURE — 85730 THROMBOPLASTIN TIME PARTIAL: CPT

## 2020-04-24 PROCEDURE — 93005 ELECTROCARDIOGRAM TRACING: CPT

## 2020-04-24 PROCEDURE — 71045 X-RAY EXAM CHEST 1 VIEW: CPT

## 2020-04-24 PROCEDURE — 80053 COMPREHEN METABOLIC PANEL: CPT

## 2020-04-24 NOTE — ED PROVIDER NOTE - CLINICAL SUMMARY MEDICAL DECISION MAKING FREE TEXT BOX
Healthy otherwise uncomplicated young pt with chest pain. Plan: r/o COVID, basic blood work, EKG. If unremarkable findings outpatient follow up.

## 2020-04-24 NOTE — ED PROVIDER NOTE - PROGRESS NOTE DETAILS
41 y/o F presents with Cp. Pt reports intermittent chest pain, body aches, intermittent HA, and cough for the pat week. Denies fever/chils, n/v/d, SOB, abd pain, leg swelling or other complaints at this time. -Chandu Avalos PA-C 41 y/o F presents with Cp. Pt reports intermittent chest pain, body aches, intermittent HA, sore throat and cough for the pat week. Denies fever/chils, n/v/d, SOB, abd pain, leg swelling or other complaints at this time. -Chandu Avalos PA-C [id number]. Results reviewed and discussed with pt. Likely covid vs viral syndrome. Discussed importance of close FU with PMD. Pt asked to return to ED immediately for any new or concerning sx or worsening. Pt acknowledges and understands plan. -Chandu Avalos PA-C

## 2020-04-24 NOTE — ED PROVIDER NOTE - PATIENT PORTAL LINK FT
You can access the FollowMyHealth Patient Portal offered by [facility] by registering at the following website: [email or web address]. By joining [employer]’s FollowMyHealth portal, you will also be able to view your health information using other applications (apps) compatible with our system.

## 2020-04-24 NOTE — ED ADULT NURSE NOTE - OBJECTIVE STATEMENT
pt ambulatory to ED, A&O x3. pt c/o chest pain. states pain began 1 week ago and comes intermittently. endorses pain during activity and at rest. states she has also had a cough, nasal congestion, body aches and HA. Denies fever, SOB, recent travel.

## 2020-04-24 NOTE — ED ADULT NURSE NOTE - NSIMPLEMENTINTERV_GEN_ALL_ED
Implemented All Universal Safety Interventions:  [place] to call system. Call bell, personal items and telephone within reach. Instruct patient to call for assistance. Room bathroom lighting operational. Non-slip footwear when patient is off stretcher. Physically safe environment: no spills, clutter or unnecessary equipment. Stretcher in lowest position, wheels locked, appropriate side rails in place.

## 2020-04-24 NOTE — ED PROVIDER NOTE - NS_ ATTENDINGSCRIBEDETAILS _ED_A_ED_FT
I Mukesh Medrano MD saw and examined the patient. Scribe documented for me and under my supervision. I have modified the scribe's documentation where necessary to reflect my history, physical exam and other relevant documentations pertinent to the care of the patient.

## 2020-04-24 NOTE — ED PROVIDER NOTE - EYES, MLM
Clear bilaterally, pupils equal, round and reactive to light. Clear bilaterally, pupils equal, round and reactive to light. EOMI. Visual fields intact x  4 quadrants.

## 2020-04-24 NOTE — ED PROVIDER NOTE - ATTENDING CONTRIBUTION TO CARE
I Mukesh Medrano MD saw and examined the patient. MLP saw and examined the patient under my supervision. I discussed the care of the patient with MLP and agree with MLP's plan, assessment and care of the patient while in the ED.
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
## 2020-04-24 NOTE — ED PROVIDER NOTE - NSFOLLOWUPINSTRUCTIONS_ED_ALL_ED_FT
Please follow up with your Primary MD in 2-3 days. Return to ED immediately for any new or concerning symptoms or worsening symptoms    Viral Respiratory Infection  A viral respiratory infection is an illness that affects parts of the body used for breathing, like the lungs, nose, and throat. It is caused by a germ called a virus.    ImageSome examples of this kind of infection are:    A cold.  The flu (influenza).  A respiratory syncytial virus (RSV) infection.    How do I know if I have this infection?  Most of the time this infection causes:    A stuffy or runny nose.  Yellow or green fluid in the nose.  A cough.  Sneezing.  Tiredness (fatigue).  Achy muscles.  A sore throat.  Sweating or chills.  A fever.  A headache.    How is this infection treated?  If the flu is diagnosed early, it may be treated with an antiviral medicine. This medicine shortens the length of time a person has symptoms. Symptoms may be treated with over-the-counter and prescription medicines, such as:    Expectorants. These make it easier to cough up mucus.  Decongestant nasal sprays.    Doctors do not prescribe antibiotic medicines for viral infections. They do not work with this kind of infection.    How do I know if I should stay home?  To keep others from getting sick, stay home if you have:    A fever.  A lasting cough.  A sore throat.  A runny nose.  Sneezing.  Muscles aches.  Headaches.  Tiredness.  Weakness.  Chills.  Sweating.  An upset stomach (nausea).    Follow these instructions at home:  Rest as much as possible.  Take over-the-counter and prescription medicines only as told by your doctor.  Drink enough fluid to keep your pee (urine) clear or pale yellow.  Gargle with salt water. Do this 3–4 times per day or as needed. To make a salt–water mixture, dissolve ½–1 tsp of salt in 1 cup of warm water. Make sure the salt dissolves all the way.  Use nose drops made from salt water. This helps with stuffiness (congestion). It also helps soften the skin around your nose.  Do not drink alcohol.  Do not use tobacco products, including cigarettes, chewing tobacco, and e-cigarettes. If you need help quitting, ask your doctor.  Get help if:  Your symptoms last for 10 days or longer.  Your symptoms get worse over time.  You have a fever.  You have very bad pain in your face or forehead.  Parts of your jaw or neck become very swollen.  Get help right away if:  You feel pain or pressure in your chest.  You have shortness of breath.  You faint or feel like you will faint.  You keep throwing up (vomiting).  You feel confused.  This information is not intended to replace advice given to you by your health care provider. Make sure you discuss any questions you have with your health care provider.     COVID-19  COVID-19, also known as coronavirus disease or novel coronavirus, is caused by a type of virus that causes respiratory illness. This may lead to inflammation and the buildup of mucus and fluids in the airway of the lungs (pneumonia). There are many different coronaviruses. Most of these viruses only affect animals, but sometimes these viruses can change and infect people.  What are the causes?  This illness is caused by a virus. You may catch the virus by:  Breathing in droplets from an infected person's cough or sneeze.Touching something, like a table or a doorknob, that was exposed to the virus (contaminated) and then touching your mouth, nose, or eyes.Being around animals that carry the virus, or eating uncooked or undercooked meat or animal products that contain the virus.What increases the risk?  You are more likely to develop this condition if you:  Live in or travel to an area with a COVID-19 outbreak.Come in contact with a sick person who recently traveled to an area with a COVID-19 outbreak.Provide care for or live with a person who is infected with COVID-19.What are the signs or symptoms?  COVID-19 causes respiratory illness that can lead to pneumonia. Symptoms of pneumonia may include:  A fever.A cough.Difficulty breathing.How is this diagnosed?  This condition may be diagnosed based on:  Your signs and symptoms, especially if:  You live in an area with a COVID-19 outbreak.You recently traveled to or from an area where the virus is common.You provide care for or live with a person who was diagnosed with COVID-19.A physical exam.Lab tests, which may include:  A nasal swab to take a sample of fluid from your nose.A throat swab to take a sample of fluid from your throat.A sample of mucus from your lungs (sputum).Blood tests.How is this treated?  There is no medicine to treat COVID-19. Your health care provider will talk with you about ways to treat your symptoms. This may include rest, fluids, and over-the-counter medicines.  Follow these instructions at home:  Lifestyle     Use a cool-mist humidifier to add moisture to the air. This can help you breathe more easily.Do not use any products that contain nicotine or tobacco, such as cigarettes, e-cigarettes, and chewing tobacco. If you need help quitting, ask your health care provider.Rest at home as told by your health care provider.Return to your normal activities as told by your health care provider. Ask your health care provider what activities are safe for you.General instructions     Take over-the-counter and prescription medicines only as told by your health care provider.Drink enough fluid to keep your urine pale yellow.Keep all follow-up visits as told by your health care provider. This is important.How is this prevented?     There is no vaccine to help prevent COVID-19 infection. However, there are steps you can take to protect yourself and others from this virus.  To protect yourself:     Do not travel to areas where COVID-19 is a risk. The areas where COVID-19 is reported change often. To identify high-risk areas, check the CDC travel website: wwwnc.cdc.gov/travel/noticesIf you live in, or must travel to, an area where COVID-19 is a risk, take precautions to avoid infection.  Stay away from people who are sick.Stay away from places where there are animals that may carry the virus. This includes places where animals and animal products are sold. Note that both living and dead animals can carry the virus.Wash your hands often with soap and water. If soap and water are not available, use an alcohol-based hand .Avoid touching your mouth, face, eyes, or nose.To protect others:     If you have symptoms, take steps to prevent the virus from spreading to others.  If you think you have a COVID-19 infection, contact your health care provider right away. Tell your health care team that you think you may have a COVID-19 infection.Stay home. Leave your house only to seek medical care.Do not travel while you are sick.Wash your hands often with soap and water. If soap and water are not available, use alcohol-based hand .Stay away from other members of your household. If possible, stay in your own room, separate from others. Use a different bathroom.Make sure that all people in your household wash their hands well and often.Cough or sneeze into a tissue or your sleeve or elbow. Do not cough or sneeze into your hand or into the air.Wear a face mask.Where to find more information  Centers for Disease Control and Prevention: www.cdc.gov/coronavirus/2019-ncov/index.htmlW[facility] Health Organization: www.who.int/health-topics/coronavirusContact a health care provider if:  You have traveled to an area where COVID-19 is a risk and you have symptoms of the infection.You have contact with someone who has traveled to an area where COVID-19 is a risk and you have symptoms of the infection.Get help right away if:  You have trouble breathing.You have chest pain.Summary  COVID-19 is caused by a type of virus that causes respiratory illness. This may lead to inflammation and the buildup of mucus and fluids in the airway of the lungs (pneumonia).You are more likely to develop this condition if you live in or travel to an area with a COVID-19 outbreak.There is no medicine to treat COVID-19. Your health care provider will talk with you about ways to treat your symptoms.Take steps to protect yourself and others from infection. Wash your hands often. Stay away from other people who are sick and wear a mask if you are sick.

## 2020-04-24 NOTE — ED PROVIDER NOTE - CARE PLAN
Principal Discharge DX:	Viral syndrome Principal Discharge DX:	Viral syndrome  Goal:	possible COVID  Secondary Diagnosis:	Myalgia

## 2020-05-18 ENCOUNTER — APPOINTMENT (OUTPATIENT)
Dept: MRI IMAGING | Facility: CLINIC | Age: 40
End: 2020-05-18
Payer: COMMERCIAL

## 2020-05-18 ENCOUNTER — OUTPATIENT (OUTPATIENT)
Dept: OUTPATIENT SERVICES | Facility: HOSPITAL | Age: 40
LOS: 1 days | End: 2020-05-18
Payer: COMMERCIAL

## 2020-05-18 DIAGNOSIS — Z41.9 ENCOUNTER FOR PROCEDURE FOR PURPOSES OTHER THAN REMEDYING HEALTH STATE, UNSPECIFIED: Chronic | ICD-10-CM

## 2020-05-18 DIAGNOSIS — M54.5 LOW BACK PAIN: ICD-10-CM

## 2020-05-18 DIAGNOSIS — Z98.51 TUBAL LIGATION STATUS: Chronic | ICD-10-CM

## 2020-05-18 DIAGNOSIS — M25.511 PAIN IN RIGHT SHOULDER: ICD-10-CM

## 2020-05-20 ENCOUNTER — APPOINTMENT (OUTPATIENT)
Dept: MRI IMAGING | Facility: CLINIC | Age: 40
End: 2020-05-20

## 2020-05-20 ENCOUNTER — RESULT REVIEW (OUTPATIENT)
Age: 40
End: 2020-05-20

## 2020-05-20 PROCEDURE — 73720 MRI LWR EXTREMITY W/O&W/DYE: CPT | Mod: 26,LT

## 2020-05-20 PROCEDURE — 73720 MRI LWR EXTREMITY W/O&W/DYE: CPT

## 2020-05-20 PROCEDURE — A9585: CPT

## 2020-05-26 ENCOUNTER — RX RENEWAL (OUTPATIENT)
Age: 40
End: 2020-05-26

## 2020-06-18 ENCOUNTER — APPOINTMENT (OUTPATIENT)
Dept: SURGERY | Facility: CLINIC | Age: 40
End: 2020-06-18
Payer: COMMERCIAL

## 2020-06-18 VITALS
DIASTOLIC BLOOD PRESSURE: 85 MMHG | BODY MASS INDEX: 31.34 KG/M2 | SYSTOLIC BLOOD PRESSURE: 123 MMHG | TEMPERATURE: 97.9 F | WEIGHT: 195 LBS | HEIGHT: 66 IN | HEART RATE: 83 BPM | OXYGEN SATURATION: 83 %

## 2020-06-18 DIAGNOSIS — M79.652 PAIN IN LEFT THIGH: ICD-10-CM

## 2020-06-18 PROCEDURE — 99201 OFFICE OUTPATIENT NEW 10 MINUTES: CPT

## 2020-06-18 NOTE — HISTORY OF PRESENT ILLNESS
[de-identified] : Known left thigh mass, S/P excision in 2014, now with pain, also pain gluteal cleft. off an on left  leg swelling, numbness. no fever. no cellulitis, drainage form either sites.   was used .

## 2020-06-18 NOTE — ASSESSMENT
[FreeTextEntry1] : no pilonidal obvious infection on imaging or exam at this point, she was instructed   to call us if she develops redness,drainage. For leg mass referred to Orthopedic surgery .

## 2020-06-18 NOTE — PHYSICAL EXAM
[de-identified] : left thigh lateral inions CDI, mild firmness, no cellulitis.  [de-identified] : mild firmness eft gluteal cleft,no tenderness, no cellulitis, or drainage.

## 2020-06-25 ENCOUNTER — APPOINTMENT (OUTPATIENT)
Dept: ORTHOPEDIC SURGERY | Facility: CLINIC | Age: 40
End: 2020-06-25
Payer: COMMERCIAL

## 2020-06-25 DIAGNOSIS — M79.89 OTHER SPECIFIED SOFT TISSUE DISORDERS: ICD-10-CM

## 2020-06-25 PROCEDURE — 99203 OFFICE O/P NEW LOW 30 MIN: CPT

## 2020-06-25 NOTE — PHYSICAL EXAM
[FreeTextEntry1] : Physical exam reveals a healthy looking patient in no apparent distress patient appears to be fully alert oriented having mild localized tenderness over the lateral aspect of the left proximal thigh on exam and patient having full range of motion of the hip and knee no swelling no palpable mass no gross neurovascular deficit again review of the recent MRI scan of the left proximal thigh demonstrate a deeply seated femur soft tissue mass measuring about 4 cm in diameter suggest to be persistent localized fibromatosis at this stage patient was recommended to be followed conservatively and to be seen again in 9 months for follow-up the need for possible future surgical procedure could not be ruled out.

## 2020-07-06 ENCOUNTER — APPOINTMENT (OUTPATIENT)
Dept: OTOLARYNGOLOGY | Facility: CLINIC | Age: 40
End: 2020-07-06
Payer: COMMERCIAL

## 2020-07-06 VITALS — TEMPERATURE: 98 F | WEIGHT: 197 LBS | BODY MASS INDEX: 31.66 KG/M2 | HEIGHT: 66 IN

## 2020-07-06 DIAGNOSIS — Z87.828 PERSONAL HISTORY OF OTHER (HEALED) PHYSICAL INJURY AND TRAUMA: ICD-10-CM

## 2020-07-06 DIAGNOSIS — Z87.19 PERSONAL HISTORY OF OTHER DISEASES OF THE DIGESTIVE SYSTEM: ICD-10-CM

## 2020-07-06 PROCEDURE — 99204 OFFICE O/P NEW MOD 45 MIN: CPT | Mod: 25

## 2020-07-06 PROCEDURE — 31231 NASAL ENDOSCOPY DX: CPT

## 2020-07-06 RX ORDER — AMOXICILLIN AND CLAVULANATE POTASSIUM 875; 125 MG/1; MG/1
875-125 TABLET, COATED ORAL TWICE DAILY
Qty: 20 | Refills: 1 | Status: COMPLETED | COMMUNITY
Start: 2020-07-06 | End: 2020-07-26

## 2020-07-06 NOTE — CONSULT LETTER
[Consult Closing:] : Thank you very much for allowing me to participate in the care of this patient.  If you have any questions, please do not hesitate to contact me. [Sincerely,] : Sincerely, [Consult Letter:] : I had the pleasure of evaluating your patient, [unfilled]. [FreeTextEntry1] : Dear Dr. SUKHDEV IGLESIAS,\par \par Thank you for your kind referral. Please refer to my enclosed office notes for LENNIE ALANIZ . If there are any questions free to contact me.\par  [FreeTextEntry3] : Andrew Brennan MD, FACS\par

## 2020-07-06 NOTE — PROCEDURE
[FreeTextEntry6] : Indication for procedure:  Unable to adequately examine mid and posterior nasal cavity with anterior rhinoscopy\par Sinus endoscope # 4\par Nasal septum exam shows septal deviation to the rt w spur impacted posteriorly\par The inferior nasal turbinates are moderately hypertrophic in size bilaterally.\par The sinus endoscope was introduced into the right nares\par exam right middle meatus reveals no mucopus or inflammation.  The right middle turbinate is WNL.\par The scope was advanced and the sphenoethmoid region was inspected.\par The superior meatus, superior turbinate and nasal vault are unremarkable.  The nasopharynx is unremarkable without inflammation or mass.\par The sinus endoscope was introduced into the left nares and\par exam left middle meatus reveals no mucopus or inflammation.  The left middle turbinate is WNL.\par The scope was advanced and the sphenoethmoid region was inspected.\par The left superior meatus and nasal vault are unremarkable.\par

## 2020-07-06 NOTE — ASSESSMENT
[FreeTextEntry1] : deviated septum w chronic nasal obstructon\par hypertrophic nasal turbinates\par probable allergic rhintis\par ?muscle tension headaches\par change to azelastine spray\par ?chronic sinusitis augmentin x 10 d\par if not better ct sinuses

## 2020-07-06 NOTE — REASON FOR VISIT
[Initial Consultation] : an initial consultation for [Sinusitis] : sinusitis [[employer] ] : provided by [employer]   [FreeTextEntry1] : [id number] [FreeTextEntry2] : hipolito [FreeTextEntry3] : Citizen of [place]

## 2020-07-06 NOTE — REVIEW OF SYSTEMS
[Dizziness] : dizziness [Ear Pain] : ear pain [Sneezing] : sneezing [Vertigo] : vertigo [Recurrent Ear Infections] : recurrent ear infections [Nasal Congestion] : nasal congestion [Sinus Pain] : sinus pain [Lightheadedness] : lightheadedness [Sense Of Smell Problem] : sense of smell problem [Sinus Pressure] : sinus pressure [Throat Pain] : throat pain [Swelling Face] : face swelling [Eye Pain] : eye pain [Swelling Neck] : swelling neck [Chest Pain] : chest pain [Cough] : cough [Shortness Of Breath] : shortness of breath [Swollen Glands] : swollen glands [Negative] : Psychiatric [Patient Intake Form Reviewed] : Patient intake form was reviewed [FreeTextEntry3] : watery eyes [FreeTextEntry1] : difficulty swallowing    reflux  headache  muscle  aches  joint aches

## 2020-07-06 NOTE — HISTORY OF PRESENT ILLNESS
[de-identified] : co pressure and pain forehead x 3 years\par co difficulty breathing through nose\par no hx sinusitis, some pnd yellow\par rx by primary nasal spray and antihistamine\par co sneezing and eye itching

## 2020-07-27 ENCOUNTER — APPOINTMENT (OUTPATIENT)
Dept: GASTROENTEROLOGY | Facility: CLINIC | Age: 40
End: 2020-07-27
Payer: COMMERCIAL

## 2020-07-27 VITALS
BODY MASS INDEX: 31.5 KG/M2 | SYSTOLIC BLOOD PRESSURE: 134 MMHG | WEIGHT: 196 LBS | HEIGHT: 66 IN | HEART RATE: 84 BPM | DIASTOLIC BLOOD PRESSURE: 83 MMHG

## 2020-07-27 DIAGNOSIS — R10.12 LEFT UPPER QUADRANT PAIN: ICD-10-CM

## 2020-07-27 DIAGNOSIS — R12 HEARTBURN: ICD-10-CM

## 2020-07-27 DIAGNOSIS — R14.3 FLATULENCE: ICD-10-CM

## 2020-07-27 DIAGNOSIS — R11.0 NAUSEA: ICD-10-CM

## 2020-07-27 PROCEDURE — 99203 OFFICE O/P NEW LOW 30 MIN: CPT

## 2020-07-27 RX ORDER — MELOXICAM 15 MG/1
15 TABLET ORAL
Qty: 30 | Refills: 2 | Status: DISCONTINUED | COMMUNITY
Start: 2020-03-03 | End: 2020-07-27

## 2020-07-27 RX ORDER — AZELASTINE HYDROCHLORIDE 137 UG/1
0.1 SPRAY, METERED NASAL TWICE DAILY
Qty: 1 | Refills: 5 | Status: DISCONTINUED | COMMUNITY
Start: 2020-07-06 | End: 2020-07-27

## 2020-07-31 PROBLEM — R11.0 NAUSEA: Status: ACTIVE | Noted: 2020-07-31

## 2020-07-31 NOTE — PHYSICAL EXAM
[General Appearance - In No Acute Distress] : in no acute distress [Sclera] : the sclera and conjunctiva were normal [General Appearance - Alert] : alert [PERRL With Normal Accommodation] : pupils were equal in size, round, and reactive to light [Neck Cervical Mass (___cm)] : no neck mass was observed [Jugular Venous Distention Increased] : there was no jugular-venous distention [Neck Appearance] : the appearance of the neck was normal [Extraocular Movements] : extraocular movements were intact [Thyroid Diffuse Enlargement] : the thyroid was not enlarged [Thyroid Nodule] : there were no palpable thyroid nodules [Auscultation Breath Sounds / Voice Sounds] : lungs were clear to auscultation bilaterally [Heart Rate And Rhythm] : heart rate was normal and rhythm regular [Heart Sounds] : normal S1 and S2 [Murmurs] : no murmurs [Heart Sounds Pericardial Friction Rub] : no pericardial rub [Heart Sounds Gallop] : no gallops [] : no hepato-splenomegaly [Abdomen Soft] : soft [Bowel Sounds] : normal bowel sounds [Abdomen Mass (___ Cm)] : no abdominal mass palpated [Nail Clubbing] : no clubbing  or cyanosis of the fingernails [Abnormal Walk] : normal gait [FreeTextEntry1] : tender luq [Motor Tone] : muscle strength and tone were normal [Musculoskeletal - Swelling] : no joint swelling seen

## 2020-07-31 NOTE — HISTORY OF PRESENT ILLNESS
[de-identified] : 39yo female for evaluation of heartburn and luq pain\par She has had this for months with negative sono, egd and colonoscopy in the past\par Symptoms are worse since she had covid\par no help with omeprazole\par Seems to be worse with eating though no specific food triggers, some nausea

## 2020-07-31 NOTE — REVIEW OF SYSTEMS
[As Noted in HPI] : as noted in HPI [Abdominal Pain] : abdominal pain [Vomiting] : no vomiting [Heartburn] : heartburn [Diarrhea] : no diarrhea [Negative] : Heme/Lymph

## 2020-07-31 NOTE — ASSESSMENT
[FreeTextEntry1] : 41yo fmeale with gerd, luq pain\par \par exacerbated by covid but symptoms present before\par \par will try empiric pantoprazole and carafate suspension\par if no help, consider antispasmodics\par

## 2020-08-13 ENCOUNTER — OUTPATIENT (OUTPATIENT)
Dept: OUTPATIENT SERVICES | Facility: HOSPITAL | Age: 40
LOS: 1 days | End: 2020-08-13
Payer: COMMERCIAL

## 2020-08-13 ENCOUNTER — APPOINTMENT (OUTPATIENT)
Dept: CT IMAGING | Facility: CLINIC | Age: 40
End: 2020-08-13
Payer: COMMERCIAL

## 2020-08-13 DIAGNOSIS — Z98.51 TUBAL LIGATION STATUS: Chronic | ICD-10-CM

## 2020-08-13 DIAGNOSIS — Z41.9 ENCOUNTER FOR PROCEDURE FOR PURPOSES OTHER THAN REMEDYING HEALTH STATE, UNSPECIFIED: Chronic | ICD-10-CM

## 2020-08-13 DIAGNOSIS — J32.2 CHRONIC ETHMOIDAL SINUSITIS: ICD-10-CM

## 2020-08-13 PROCEDURE — 70486 CT MAXILLOFACIAL W/O DYE: CPT | Mod: 26

## 2020-08-13 PROCEDURE — 70486 CT MAXILLOFACIAL W/O DYE: CPT

## 2020-09-16 ENCOUNTER — APPOINTMENT (OUTPATIENT)
Dept: OTOLARYNGOLOGY | Facility: CLINIC | Age: 40
End: 2020-09-16

## 2020-09-21 ENCOUNTER — APPOINTMENT (OUTPATIENT)
Dept: OTOLARYNGOLOGY | Facility: CLINIC | Age: 40
End: 2020-09-21

## 2020-09-23 ENCOUNTER — APPOINTMENT (OUTPATIENT)
Dept: OTOLARYNGOLOGY | Facility: CLINIC | Age: 40
End: 2020-09-23
Payer: COMMERCIAL

## 2020-09-23 VITALS — TEMPERATURE: 98.4 F | WEIGHT: 198 LBS | BODY MASS INDEX: 31.82 KG/M2 | HEIGHT: 66 IN

## 2020-09-23 DIAGNOSIS — H92.03 OTALGIA, BILATERAL: ICD-10-CM

## 2020-09-23 DIAGNOSIS — H69.83 OTHER SPECIFIED DISORDERS OF EUSTACHIAN TUBE, BILATERAL: ICD-10-CM

## 2020-09-23 PROCEDURE — 99213 OFFICE O/P EST LOW 20 MIN: CPT

## 2020-09-23 NOTE — REVIEW OF SYSTEMS
[Ear Pain] : ear pain [Negative] : Heme/Lymph [Patient Intake Form Reviewed] : Patient intake form was reviewed

## 2020-09-23 NOTE — REASON FOR VISIT
[Subsequent Evaluation] : a subsequent evaluation for [Ear Pain] : ear pain [FreeTextEntry2] : left ear

## 2020-09-23 NOTE — PHYSICAL EXAM
[Midline] : trachea located in midline position [Normal] : no rashes [de-identified] : tender bilaterally no clic

## 2020-10-20 ENCOUNTER — NON-APPOINTMENT (OUTPATIENT)
Age: 40
End: 2020-10-20

## 2020-11-19 ENCOUNTER — APPOINTMENT (OUTPATIENT)
Dept: ULTRASOUND IMAGING | Facility: CLINIC | Age: 40
End: 2020-11-19
Payer: COMMERCIAL

## 2020-11-19 ENCOUNTER — OUTPATIENT (OUTPATIENT)
Dept: OUTPATIENT SERVICES | Facility: HOSPITAL | Age: 40
LOS: 1 days | End: 2020-11-19
Payer: COMMERCIAL

## 2020-11-19 DIAGNOSIS — Z00.8 ENCOUNTER FOR OTHER GENERAL EXAMINATION: ICD-10-CM

## 2020-11-19 DIAGNOSIS — Z98.51 TUBAL LIGATION STATUS: Chronic | ICD-10-CM

## 2020-11-19 DIAGNOSIS — Z41.9 ENCOUNTER FOR PROCEDURE FOR PURPOSES OTHER THAN REMEDYING HEALTH STATE, UNSPECIFIED: Chronic | ICD-10-CM

## 2020-11-19 PROCEDURE — 76856 US EXAM PELVIC COMPLETE: CPT

## 2020-11-19 PROCEDURE — 76856 US EXAM PELVIC COMPLETE: CPT | Mod: 26

## 2020-11-19 PROCEDURE — 76830 TRANSVAGINAL US NON-OB: CPT

## 2020-11-19 PROCEDURE — 76830 TRANSVAGINAL US NON-OB: CPT | Mod: 26

## 2020-12-18 ENCOUNTER — RX RENEWAL (OUTPATIENT)
Age: 40
End: 2020-12-18

## 2021-02-12 ENCOUNTER — RX RENEWAL (OUTPATIENT)
Age: 41
End: 2021-02-12

## 2021-03-02 ENCOUNTER — OUTPATIENT (OUTPATIENT)
Dept: OUTPATIENT SERVICES | Facility: HOSPITAL | Age: 41
LOS: 1 days | End: 2021-03-02
Payer: COMMERCIAL

## 2021-03-02 ENCOUNTER — RESULT REVIEW (OUTPATIENT)
Age: 41
End: 2021-03-02

## 2021-03-02 ENCOUNTER — APPOINTMENT (OUTPATIENT)
Dept: MAMMOGRAPHY | Facility: CLINIC | Age: 41
End: 2021-03-02
Payer: COMMERCIAL

## 2021-03-02 DIAGNOSIS — Z41.9 ENCOUNTER FOR PROCEDURE FOR PURPOSES OTHER THAN REMEDYING HEALTH STATE, UNSPECIFIED: Chronic | ICD-10-CM

## 2021-03-02 DIAGNOSIS — Z00.8 ENCOUNTER FOR OTHER GENERAL EXAMINATION: ICD-10-CM

## 2021-03-02 DIAGNOSIS — Z98.51 TUBAL LIGATION STATUS: Chronic | ICD-10-CM

## 2021-03-02 PROCEDURE — 77063 BREAST TOMOSYNTHESIS BI: CPT

## 2021-03-02 PROCEDURE — 77063 BREAST TOMOSYNTHESIS BI: CPT | Mod: 26

## 2021-03-02 PROCEDURE — 77067 SCR MAMMO BI INCL CAD: CPT | Mod: 26

## 2021-03-02 PROCEDURE — 77067 SCR MAMMO BI INCL CAD: CPT

## 2021-04-16 ENCOUNTER — RX RENEWAL (OUTPATIENT)
Age: 41
End: 2021-04-16

## 2021-05-26 NOTE — ED ADULT TRIAGE NOTE - NS ED TRIAGE AVPU SCALE
Has appt for holter 06/08/2021 but  not yet for stress test. Will await stress tst to be scheduled and then call pt with August appt  About 7-10 days after stress completed.     Alert-The patient is alert, awake and responds to voice. The patient is oriented to time, place, and person. The triage nurse is able to obtain subjective information.

## 2021-06-07 ENCOUNTER — APPOINTMENT (OUTPATIENT)
Dept: RADIOLOGY | Facility: CLINIC | Age: 41
End: 2021-06-07

## 2021-06-07 ENCOUNTER — APPOINTMENT (OUTPATIENT)
Dept: ULTRASOUND IMAGING | Facility: CLINIC | Age: 41
End: 2021-06-07
Payer: COMMERCIAL

## 2021-06-07 ENCOUNTER — OUTPATIENT (OUTPATIENT)
Dept: OUTPATIENT SERVICES | Facility: HOSPITAL | Age: 41
LOS: 1 days | End: 2021-06-07
Payer: COMMERCIAL

## 2021-06-07 DIAGNOSIS — R07.89 OTHER CHEST PAIN: ICD-10-CM

## 2021-06-07 DIAGNOSIS — Z98.51 TUBAL LIGATION STATUS: Chronic | ICD-10-CM

## 2021-06-07 DIAGNOSIS — Z41.9 ENCOUNTER FOR PROCEDURE FOR PURPOSES OTHER THAN REMEDYING HEALTH STATE, UNSPECIFIED: Chronic | ICD-10-CM

## 2021-06-07 DIAGNOSIS — Z00.8 ENCOUNTER FOR OTHER GENERAL EXAMINATION: ICD-10-CM

## 2021-06-07 DIAGNOSIS — R10.2 PELVIC AND PERINEAL PAIN: ICD-10-CM

## 2021-06-07 PROCEDURE — 76830 TRANSVAGINAL US NON-OB: CPT

## 2021-06-07 PROCEDURE — 71046 X-RAY EXAM CHEST 2 VIEWS: CPT

## 2021-06-07 PROCEDURE — 76830 TRANSVAGINAL US NON-OB: CPT | Mod: 26

## 2021-06-07 PROCEDURE — 71046 X-RAY EXAM CHEST 2 VIEWS: CPT | Mod: 26

## 2021-06-10 ENCOUNTER — EMERGENCY (EMERGENCY)
Facility: HOSPITAL | Age: 41
LOS: 0 days | Discharge: HOME | End: 2021-06-10
Attending: EMERGENCY MEDICINE
Payer: COMMERCIAL

## 2021-06-10 VITALS
DIASTOLIC BLOOD PRESSURE: 91 MMHG | HEIGHT: 66 IN | SYSTOLIC BLOOD PRESSURE: 161 MMHG | RESPIRATION RATE: 17 BRPM | TEMPERATURE: 98 F | HEART RATE: 96 BPM | OXYGEN SATURATION: 97 % | WEIGHT: 190.04 LBS

## 2021-06-10 VITALS — HEIGHT: 62 IN

## 2021-06-10 DIAGNOSIS — M54.5 LOW BACK PAIN: ICD-10-CM

## 2021-06-10 DIAGNOSIS — Z87.2 PERSONAL HISTORY OF DISEASES OF THE SKIN AND SUBCUTANEOUS TISSUE: ICD-10-CM

## 2021-06-10 DIAGNOSIS — K21.9 GASTRO-ESOPHAGEAL REFLUX DISEASE WITHOUT ESOPHAGITIS: ICD-10-CM

## 2021-06-10 DIAGNOSIS — Z87.19 PERSONAL HISTORY OF OTHER DISEASES OF THE DIGESTIVE SYSTEM: ICD-10-CM

## 2021-06-10 DIAGNOSIS — R10.9 UNSPECIFIED ABDOMINAL PAIN: ICD-10-CM

## 2021-06-10 DIAGNOSIS — Z41.9 ENCOUNTER FOR PROCEDURE FOR PURPOSES OTHER THAN REMEDYING HEALTH STATE, UNSPECIFIED: Chronic | ICD-10-CM

## 2021-06-10 DIAGNOSIS — Z98.51 TUBAL LIGATION STATUS: Chronic | ICD-10-CM

## 2021-06-10 DIAGNOSIS — Z87.39 PERSONAL HISTORY OF OTHER DISEASES OF THE MUSCULOSKELETAL SYSTEM AND CONNECTIVE TISSUE: ICD-10-CM

## 2021-06-10 DIAGNOSIS — Z79.899 OTHER LONG TERM (CURRENT) DRUG THERAPY: ICD-10-CM

## 2021-06-10 DIAGNOSIS — I10 ESSENTIAL (PRIMARY) HYPERTENSION: ICD-10-CM

## 2021-06-10 PROCEDURE — 99284 EMERGENCY DEPT VISIT MOD MDM: CPT | Mod: 25

## 2021-06-10 PROCEDURE — 99284 EMERGENCY DEPT VISIT MOD MDM: CPT

## 2021-06-10 PROCEDURE — 96372 THER/PROPH/DIAG INJ SC/IM: CPT

## 2021-06-10 RX ORDER — KETOROLAC TROMETHAMINE 30 MG/ML
30 SYRINGE (ML) INJECTION ONCE
Refills: 0 | Status: DISCONTINUED | OUTPATIENT
Start: 2021-06-10 | End: 2021-06-10

## 2021-06-10 RX ORDER — CYCLOBENZAPRINE HYDROCHLORIDE 10 MG/1
1 TABLET, FILM COATED ORAL
Qty: 15 | Refills: 0
Start: 2021-06-10 | End: 2021-06-14

## 2021-06-10 RX ORDER — LIDOCAINE 4 G/100G
1 CREAM TOPICAL ONCE
Refills: 0 | Status: COMPLETED | OUTPATIENT
Start: 2021-06-10 | End: 2021-06-10

## 2021-06-10 RX ORDER — ACETAMINOPHEN 500 MG
650 TABLET ORAL ONCE
Refills: 0 | Status: COMPLETED | OUTPATIENT
Start: 2021-06-10 | End: 2021-06-10

## 2021-06-10 RX ORDER — LIDOCAINE 4 G/100G
1 CREAM TOPICAL
Qty: 7 | Refills: 0
Start: 2021-06-10 | End: 2021-06-16

## 2021-06-10 RX ORDER — CYCLOBENZAPRINE HYDROCHLORIDE 10 MG/1
10 TABLET, FILM COATED ORAL ONCE
Refills: 0 | Status: COMPLETED | OUTPATIENT
Start: 2021-06-10 | End: 2021-06-10

## 2021-06-10 RX ADMIN — Medication 650 MILLIGRAM(S): at 15:02

## 2021-06-10 RX ADMIN — Medication 30 MILLIGRAM(S): at 15:03

## 2021-06-10 RX ADMIN — CYCLOBENZAPRINE HYDROCHLORIDE 10 MILLIGRAM(S): 10 TABLET, FILM COATED ORAL at 15:02

## 2021-06-10 RX ADMIN — LIDOCAINE 1 PATCH: 4 CREAM TOPICAL at 15:05

## 2021-06-10 NOTE — ED STATDOCS - PATIENT PORTAL LINK FT
You can access the FollowMyHealth Patient Portal offered by [facility] by registering at the following website: [email or web address]. By joining [facility]’s FollowMyHealth portal, you will also be able to view your health information using other applications (apps) compatible with our system.

## 2021-06-10 NOTE — ED STATDOCS - ATTENDING CONTRIBUTION TO CARE
I, Ronit Maldonado MD,  performed the initial face to face bedside interview with this patient regarding history of present illness, review of symptoms and relevant past medical, social and family history.  I completed an independent physical examination.  I was the initial provider who evaluated this patient. I have signed out the follow up of any pending tests (i.e. labs, radiological studies) to the ACP.  I have communicated the patient’s plan of care and disposition with the ACP.  The history, relevant review of systems, past medical and surgical history, medical decision making, and physical examination was documented by the scribe in my presence and I attest to the accuracy of the documentation.

## 2021-06-10 NOTE — ED STATDOCS - PROGRESS NOTE DETAILS
42 yo female with a PMH of htn, gerd, anemia, allergies presents with R sided back pain since last night. Pt was sleeping when the pain started. Pt is non radiating located to the R paralumbar region. Denies fever, incontinence of urine or stool. Pt has not taken anything for pain. No midline ttp. Marcos treat with meds and d/c home with meds. -Rafa Benjamin PA-C

## 2021-06-10 NOTE — ED STATDOCS - NSFOLLOWUPINSTRUCTIONS_ED_ALL_ED_FT
Follow up with your primary care doctor and with your spine doctor for further evaluation of your symptoms.   Take the medication as directed. Apply heat to the area of pain. Rest the back and no signifiant lifitng, bending, or exercise.     Return to the ER for any new or other concerns.       Back Pain    WHAT YOU NEED TO KNOW:    Back pain is common. It can be caused by many conditions, such as arthritis or the breakdown of spinal discs. Your risk for back pain is increased by injuries, lack of activity, or repeated bending and twisting. You may feel sore or stiff on one or both sides of your back. The pain may spread to your buttocks or thighs.    DISCHARGE INSTRUCTIONS:    Return to the emergency department if:     You have pain, numbness, or weakness in one or both legs.      Your pain becomes so severe that you cannot walk.      You cannot control your urine or bowel movements.      You have severe back pain with chest pain.      You have severe back pain, nausea, and vomiting.      You have severe back pain that spreads to your side or genital area.    Contact your healthcare provider if:     You have back pain that does not get better with rest and pain medicine.      You have a fever.      You have pain that worsens when you are on your back or when you rest.      You have pain that worsens when you cough or sneeze.      You lose weight without trying.      You have questions or concerns about your condition or care.    Medicines:     NSAIDs help decrease swelling and pain. This medicine is available with or without a doctor's order. NSAIDs can cause stomach bleeding or kidney problems in certain people. If you take blood thinner medicine, always ask your healthcare provider if NSAIDs are safe for you. Always read the medicine label and follow directions.      Acetaminophen decreases pain and fever. It is available without a doctor's order. Ask how much to take and how often to take it. Follow directions. Read the labels of all other medicines you are using to see if they also contain acetaminophen, or ask your doctor or pharmacist. Acetaminophen can cause liver damage if not taken correctly. Do not use more than 4 grams (4,000 milligrams) total of acetaminophen in one day.       Muscle relaxers help decrease muscle spasms and back pain.      Prescription pain medicine may be given. Ask your healthcare provider how to take this medicine safely. Some prescription pain medicines contain acetaminophen. Do not take other medicines that contain acetaminophen without talking to your healthcare provider. Too much acetaminophen may cause liver damage. Prescription pain medicine may cause constipation. Ask your healthcare provider how to prevent or treat constipation.       Take your medicine as directed. Contact your healthcare provider if you think your medicine is not helping or if you have side effects. Tell him or her if you are allergic to any medicine. Keep a list of the medicines, vitamins, and herbs you take. Include the amounts, and when and why you take them. Bring the list or the pill bottles to follow-up visits. Carry your medicine list with you in case of an emergency.    How to manage your back pain:     Apply ice on your back for 15 to 20 minutes every hour or as directed. Use an ice pack, or put crushed ice in a plastic bag. Cover it with a towel before you apply it to your skin. Ice helps prevent tissue damage and decreases pain.      Apply heat on your back for 20 to 30 minutes every 2 hours for as many days as directed. Heat helps decrease pain and muscle spasms.      Stay active as much as you can without causing more pain. Bed rest could make your back pain worse. Avoid heavy lifting until your pain is gone.      Go to physical therapy as directed. A physical therapist can teach you exercises to help improve movement and strength, and to decrease pain.    Follow up with your healthcare provider in 2 weeks, or as directed: Write down your questions so you remember to ask them during your visits.
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
## 2021-06-10 NOTE — ED STATDOCS - MUSCULOSKELETAL, MLM
range of motion is not limited and there is no muscle tenderness. R lower paraspinal TTP w/ muscle spasms, palpable.
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
## 2021-06-22 DIAGNOSIS — R31.29 OTHER MICROSCOPIC HEMATURIA: ICD-10-CM

## 2021-06-22 DIAGNOSIS — Z01.818 ENCOUNTER FOR OTHER PREPROCEDURAL EXAMINATION: ICD-10-CM

## 2021-06-22 DIAGNOSIS — R10.84 GENERALIZED ABDOMINAL PAIN: ICD-10-CM

## 2021-06-22 DIAGNOSIS — M26.629 ARTHRALGIA OF TEMPOROMANDIBULAR JOINT,: ICD-10-CM

## 2021-06-22 DIAGNOSIS — Z48.89 ENCOUNTER FOR OTHER SPECIFIED SURGICAL AFTERCARE: ICD-10-CM

## 2021-06-22 DIAGNOSIS — H92.02 OTALGIA, LEFT EAR: ICD-10-CM

## 2021-06-29 ENCOUNTER — APPOINTMENT (OUTPATIENT)
Dept: OBGYN | Facility: CLINIC | Age: 41
End: 2021-06-29

## 2021-07-13 ENCOUNTER — NON-APPOINTMENT (OUTPATIENT)
Age: 41
End: 2021-07-13

## 2021-07-13 ENCOUNTER — APPOINTMENT (OUTPATIENT)
Dept: DERMATOLOGY | Facility: CLINIC | Age: 41
End: 2021-07-13
Payer: COMMERCIAL

## 2021-07-13 VITALS — BODY MASS INDEX: 31.82 KG/M2 | HEIGHT: 66 IN | WEIGHT: 198 LBS

## 2021-07-13 DIAGNOSIS — L81.1 CHLOASMA: ICD-10-CM

## 2021-07-13 DIAGNOSIS — L91.8 OTHER HYPERTROPHIC DISORDERS OF THE SKIN: ICD-10-CM

## 2021-07-13 PROCEDURE — 99204 OFFICE O/P NEW MOD 45 MIN: CPT | Mod: 25

## 2021-07-13 PROCEDURE — 11200 RMVL SKIN TAGS UP TO&INC 15: CPT

## 2021-07-13 PROCEDURE — 99072 ADDL SUPL MATRL&STAF TM PHE: CPT

## 2021-07-13 PROCEDURE — 0028D: CPT

## 2021-07-22 ENCOUNTER — OUTPATIENT (OUTPATIENT)
Dept: OUTPATIENT SERVICES | Facility: HOSPITAL | Age: 41
LOS: 1 days | Discharge: HOME | End: 2021-07-22

## 2021-07-22 DIAGNOSIS — Z41.9 ENCOUNTER FOR PROCEDURE FOR PURPOSES OTHER THAN REMEDYING HEALTH STATE, UNSPECIFIED: Chronic | ICD-10-CM

## 2021-07-22 DIAGNOSIS — Z98.51 TUBAL LIGATION STATUS: Chronic | ICD-10-CM

## 2021-07-22 DIAGNOSIS — D72.829 ELEVATED WHITE BLOOD CELL COUNT, UNSPECIFIED: ICD-10-CM

## 2021-07-27 ENCOUNTER — APPOINTMENT (OUTPATIENT)
Dept: HEMATOLOGY ONCOLOGY | Facility: CLINIC | Age: 41
End: 2021-07-27
Payer: COMMERCIAL

## 2021-07-27 ENCOUNTER — RESULT REVIEW (OUTPATIENT)
Age: 41
End: 2021-07-27

## 2021-07-27 VITALS
BODY MASS INDEX: 34.54 KG/M2 | TEMPERATURE: 98.1 F | WEIGHT: 214 LBS | SYSTOLIC BLOOD PRESSURE: 132 MMHG | HEART RATE: 88 BPM | DIASTOLIC BLOOD PRESSURE: 85 MMHG

## 2021-07-27 DIAGNOSIS — D72.829 ELEVATED WHITE BLOOD CELL COUNT, UNSPECIFIED: ICD-10-CM

## 2021-07-27 LAB
BASOPHILS # BLD AUTO: 0.04 K/UL — SIGNIFICANT CHANGE UP (ref 0–0.2)
BASOPHILS NFR BLD AUTO: 0.4 % — SIGNIFICANT CHANGE UP (ref 0–2)
EOSINOPHIL # BLD AUTO: 0.21 K/UL — SIGNIFICANT CHANGE UP (ref 0–0.5)
EOSINOPHIL NFR BLD AUTO: 1.9 % — SIGNIFICANT CHANGE UP (ref 0–6)
HCT VFR BLD CALC: 38.6 % — SIGNIFICANT CHANGE UP (ref 34.5–45)
HGB BLD-MCNC: 12.1 G/DL — SIGNIFICANT CHANGE UP (ref 11.5–15.5)
IMM GRANULOCYTES NFR BLD AUTO: 0.3 % — SIGNIFICANT CHANGE UP (ref 0–1.5)
LYMPHOCYTES # BLD AUTO: 2.93 K/UL — SIGNIFICANT CHANGE UP (ref 1–3.3)
LYMPHOCYTES # BLD AUTO: 26.6 % — SIGNIFICANT CHANGE UP (ref 13–44)
MCHC RBC-ENTMCNC: 26.9 PG — LOW (ref 27–34)
MCHC RBC-ENTMCNC: 31.3 GM/DL — LOW (ref 32–36)
MCV RBC AUTO: 85.8 FL — SIGNIFICANT CHANGE UP (ref 80–100)
MONOCYTES # BLD AUTO: 0.65 K/UL — SIGNIFICANT CHANGE UP (ref 0–0.9)
MONOCYTES NFR BLD AUTO: 5.9 % — SIGNIFICANT CHANGE UP (ref 2–14)
NEUTROPHILS # BLD AUTO: 7.17 K/UL — SIGNIFICANT CHANGE UP (ref 1.8–7.4)
NEUTROPHILS NFR BLD AUTO: 64.9 % — SIGNIFICANT CHANGE UP (ref 43–77)
NRBC # BLD: 0 /100 WBCS — SIGNIFICANT CHANGE UP (ref 0–0)
PLATELET # BLD AUTO: 362 K/UL — SIGNIFICANT CHANGE UP (ref 150–400)
RBC # BLD: 4.5 M/UL — SIGNIFICANT CHANGE UP (ref 3.8–5.2)
RBC # FLD: 15.6 % — HIGH (ref 10.3–14.5)
WBC # BLD: 11.03 K/UL — HIGH (ref 3.8–10.5)
WBC # FLD AUTO: 11.03 K/UL — HIGH (ref 3.8–10.5)

## 2021-07-27 PROCEDURE — 99202 OFFICE O/P NEW SF 15 MIN: CPT

## 2021-07-27 NOTE — ASSESSMENT
[FreeTextEntry1] : Mild reactive leukocytosis, with no evidence of myeloproliferative disease.\par Continue as per primary care physician.\par Available as needed.

## 2021-07-27 NOTE — HISTORY OF PRESENT ILLNESS
[de-identified] : This 41-year-old woman was referred for a white count of 12.6.  Hemoglobin was 12.5 and platelet count 374,000 on June 9.\par There has been no recent fever or infection.\par Today's white count is 11,000, and the differential shows 65 neutrophils, 26 lymphocytes, 5 monocytes and 2 eosinophils.\par The peripheral blood smear has been reviewed and there are no immature forms.

## 2021-07-30 ENCOUNTER — APPOINTMENT (OUTPATIENT)
Dept: RHEUMATOLOGY | Facility: HOSPITAL | Age: 41
End: 2021-07-30
Payer: COMMERCIAL

## 2021-07-30 ENCOUNTER — OUTPATIENT (OUTPATIENT)
Dept: OUTPATIENT SERVICES | Facility: HOSPITAL | Age: 41
LOS: 1 days | End: 2021-07-30
Payer: COMMERCIAL

## 2021-07-30 VITALS
WEIGHT: 214 LBS | HEIGHT: 66 IN | HEART RATE: 76 BPM | BODY MASS INDEX: 34.39 KG/M2 | TEMPERATURE: 97 F | RESPIRATION RATE: 14 BRPM | SYSTOLIC BLOOD PRESSURE: 124 MMHG | DIASTOLIC BLOOD PRESSURE: 88 MMHG

## 2021-07-30 DIAGNOSIS — Z41.9 ENCOUNTER FOR PROCEDURE FOR PURPOSES OTHER THAN REMEDYING HEALTH STATE, UNSPECIFIED: Chronic | ICD-10-CM

## 2021-07-30 DIAGNOSIS — G89.29 OTHER CHRONIC PAIN: ICD-10-CM

## 2021-07-30 DIAGNOSIS — M06.9 RHEUMATOID ARTHRITIS, UNSPECIFIED: ICD-10-CM

## 2021-07-30 DIAGNOSIS — Z98.51 TUBAL LIGATION STATUS: Chronic | ICD-10-CM

## 2021-07-30 LAB
24R-OH-CALCIDIOL SERPL-MCNC: 38.3 NG/ML — SIGNIFICANT CHANGE UP (ref 30–80)
ALBUMIN SERPL ELPH-MCNC: 4.2 G/DL — SIGNIFICANT CHANGE UP (ref 3.3–5)
ALP SERPL-CCNC: 114 U/L — SIGNIFICANT CHANGE UP (ref 40–120)
ALT FLD-CCNC: 16 U/L — SIGNIFICANT CHANGE UP (ref 10–45)
ANION GAP SERPL CALC-SCNC: 12 MMOL/L — SIGNIFICANT CHANGE UP (ref 5–17)
AST SERPL-CCNC: 15 U/L — SIGNIFICANT CHANGE UP (ref 10–40)
BASOPHILS # BLD AUTO: 0.08 K/UL — SIGNIFICANT CHANGE UP (ref 0–0.2)
BASOPHILS NFR BLD AUTO: 0.8 % — SIGNIFICANT CHANGE UP (ref 0–2)
BILIRUB SERPL-MCNC: 0.4 MG/DL — SIGNIFICANT CHANGE UP (ref 0.2–1.2)
BUN SERPL-MCNC: 12 MG/DL — SIGNIFICANT CHANGE UP (ref 7–23)
CALCIUM SERPL-MCNC: 9.6 MG/DL — SIGNIFICANT CHANGE UP (ref 8.4–10.5)
CHLORIDE SERPL-SCNC: 104 MMOL/L — SIGNIFICANT CHANGE UP (ref 96–108)
CO2 SERPL-SCNC: 24 MMOL/L — SIGNIFICANT CHANGE UP (ref 22–31)
CREAT SERPL-MCNC: 0.74 MG/DL — SIGNIFICANT CHANGE UP (ref 0.5–1.3)
CRP SERPL-MCNC: 23 MG/L — HIGH
EOSINOPHIL # BLD AUTO: 0.21 K/UL — SIGNIFICANT CHANGE UP (ref 0–0.5)
EOSINOPHIL NFR BLD AUTO: 2 % — SIGNIFICANT CHANGE UP (ref 0–6)
ERYTHROCYTE [SEDIMENTATION RATE] IN BLOOD: 55 MM/HR — HIGH (ref 0–15)
GLUCOSE SERPL-MCNC: 98 MG/DL — SIGNIFICANT CHANGE UP (ref 70–99)
HCT VFR BLD CALC: 42.2 % — SIGNIFICANT CHANGE UP (ref 34.5–45)
HGB BLD-MCNC: 13.3 G/DL — SIGNIFICANT CHANGE UP (ref 11.5–15.5)
IMM GRANULOCYTES NFR BLD AUTO: 0.3 % — SIGNIFICANT CHANGE UP (ref 0–1.5)
LYMPHOCYTES # BLD AUTO: 2.72 K/UL — SIGNIFICANT CHANGE UP (ref 1–3.3)
LYMPHOCYTES # BLD AUTO: 26.3 % — SIGNIFICANT CHANGE UP (ref 13–44)
MCHC RBC-ENTMCNC: 27 PG — SIGNIFICANT CHANGE UP (ref 27–34)
MCHC RBC-ENTMCNC: 31.5 GM/DL — LOW (ref 32–36)
MCV RBC AUTO: 85.8 FL — SIGNIFICANT CHANGE UP (ref 80–100)
MONOCYTES # BLD AUTO: 0.72 K/UL — SIGNIFICANT CHANGE UP (ref 0–0.9)
MONOCYTES NFR BLD AUTO: 6.9 % — SIGNIFICANT CHANGE UP (ref 2–14)
NEUTROPHILS # BLD AUTO: 6.6 K/UL — SIGNIFICANT CHANGE UP (ref 1.8–7.4)
NEUTROPHILS NFR BLD AUTO: 63.7 % — SIGNIFICANT CHANGE UP (ref 43–77)
PLATELET # BLD AUTO: 435 K/UL — HIGH (ref 150–400)
POTASSIUM SERPL-MCNC: 4 MMOL/L — SIGNIFICANT CHANGE UP (ref 3.5–5.3)
POTASSIUM SERPL-SCNC: 4 MMOL/L — SIGNIFICANT CHANGE UP (ref 3.5–5.3)
PROT SERPL-MCNC: 7.1 G/DL — SIGNIFICANT CHANGE UP (ref 6–8.3)
RBC # BLD: 4.92 M/UL — SIGNIFICANT CHANGE UP (ref 3.8–5.2)
RBC # FLD: 15.6 % — HIGH (ref 10.3–14.5)
RHEUMATOID FACT SERPL-ACNC: <10 IU/ML — SIGNIFICANT CHANGE UP (ref 0–13)
SODIUM SERPL-SCNC: 140 MMOL/L — SIGNIFICANT CHANGE UP (ref 135–145)
T4 FREE+ TSH PNL SERPL: 1.16 UIU/ML — SIGNIFICANT CHANGE UP (ref 0.27–4.2)
WBC # BLD: 10.36 K/UL — SIGNIFICANT CHANGE UP (ref 3.8–10.5)
WBC # FLD AUTO: 10.36 K/UL — SIGNIFICANT CHANGE UP (ref 3.8–10.5)

## 2021-07-30 PROCEDURE — 80053 COMPREHEN METABOLIC PANEL: CPT

## 2021-07-30 PROCEDURE — 85652 RBC SED RATE AUTOMATED: CPT

## 2021-07-30 PROCEDURE — 86038 ANTINUCLEAR ANTIBODIES: CPT

## 2021-07-30 PROCEDURE — 84443 ASSAY THYROID STIM HORMONE: CPT

## 2021-07-30 PROCEDURE — 86431 RHEUMATOID FACTOR QUANT: CPT

## 2021-07-30 PROCEDURE — 86200 CCP ANTIBODY: CPT

## 2021-07-30 PROCEDURE — G0463: CPT

## 2021-07-30 PROCEDURE — 82306 VITAMIN D 25 HYDROXY: CPT

## 2021-07-30 PROCEDURE — 85025 COMPLETE CBC W/AUTO DIFF WBC: CPT

## 2021-07-30 PROCEDURE — 86140 C-REACTIVE PROTEIN: CPT

## 2021-07-30 PROCEDURE — 99213 OFFICE O/P EST LOW 20 MIN: CPT | Mod: GC

## 2021-07-30 RX ORDER — TERCONAZOLE 4 MG/G
0.4 CREAM VAGINAL
Qty: 45 | Refills: 0 | Status: COMPLETED | COMMUNITY
Start: 2021-06-10

## 2021-07-30 RX ORDER — CHLORHEXIDINE GLUCONATE 4 %
325 (65 FE) LIQUID (ML) TOPICAL
Qty: 30 | Refills: 0 | Status: COMPLETED | COMMUNITY
Start: 2021-05-18
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
## 2021-08-02 NOTE — REVIEW OF SYSTEMS
[Recent Weight Gain (___ Lbs)] : recent [unfilled] ~Ulb weight gain [Negative] : Heme/Lymph [Fever] : no fever [Chills] : no chills [de-identified] : Headache
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
## 2021-08-02 NOTE — PHYSICAL EXAM
[General Appearance - Alert] : alert [General Appearance - In No Acute Distress] : in no acute distress [Sclera] : the sclera and conjunctiva were normal [Extraocular Movements] : extraocular movements were intact [Jugular Venous Distention Increased] : there was no jugular-venous distention [Auscultation Breath Sounds / Voice Sounds] : lungs were clear to auscultation bilaterally [Heart Rate And Rhythm] : heart rate was normal and rhythm regular [Heart Sounds] : normal S1 and S2 [Edema] : there was no peripheral edema [Bowel Sounds] : normal bowel sounds [Abdomen Soft] : soft [Abdomen Tenderness] : non-tender [Musculoskeletal - Swelling] : no joint swelling seen [Motor Tone] : muscle strength and tone were normal [] : no rash [No Focal Deficits] : no focal deficits [Oriented To Time, Place, And Person] : oriented to person, place, and time [FreeTextEntry1] : multiple tender points throughout, including upper and lower extermities and back. Mostly in musculature.

## 2021-08-03 DIAGNOSIS — G89.29 OTHER CHRONIC PAIN: ICD-10-CM

## 2021-08-03 LAB
CCP IGG SERPL-ACNC: <8 UNITS — SIGNIFICANT CHANGE UP
RF+CCP IGG SER-IMP: NEGATIVE — SIGNIFICANT CHANGE UP

## 2021-08-04 LAB
ANA PAT FLD IF-IMP: SIGNIFICANT CHANGE UP
ANA TITR SER: ABNORMAL

## 2021-09-24 ENCOUNTER — OUTPATIENT (OUTPATIENT)
Dept: OUTPATIENT SERVICES | Facility: HOSPITAL | Age: 41
LOS: 1 days | End: 2021-09-24
Payer: COMMERCIAL

## 2021-09-24 ENCOUNTER — APPOINTMENT (OUTPATIENT)
Dept: RHEUMATOLOGY | Facility: HOSPITAL | Age: 41
End: 2021-09-24
Payer: COMMERCIAL

## 2021-09-24 VITALS
BODY MASS INDEX: 33.91 KG/M2 | WEIGHT: 211 LBS | HEIGHT: 66 IN | DIASTOLIC BLOOD PRESSURE: 88 MMHG | SYSTOLIC BLOOD PRESSURE: 133 MMHG | HEART RATE: 85 BPM | TEMPERATURE: 97.7 F

## 2021-09-24 DIAGNOSIS — M06.9 RHEUMATOID ARTHRITIS, UNSPECIFIED: ICD-10-CM

## 2021-09-24 DIAGNOSIS — M79.10 MYALGIA, UNSPECIFIED SITE: ICD-10-CM

## 2021-09-24 DIAGNOSIS — M79.7 FIBROMYALGIA: ICD-10-CM

## 2021-09-24 DIAGNOSIS — Z41.9 ENCOUNTER FOR PROCEDURE FOR PURPOSES OTHER THAN REMEDYING HEALTH STATE, UNSPECIFIED: Chronic | ICD-10-CM

## 2021-09-24 DIAGNOSIS — Z98.51 TUBAL LIGATION STATUS: Chronic | ICD-10-CM

## 2021-09-24 PROCEDURE — 99214 OFFICE O/P EST MOD 30 MIN: CPT | Mod: GC

## 2021-09-24 PROCEDURE — G0463: CPT

## 2021-09-24 RX ORDER — SUCRALFATE 1 G/10ML
1 SUSPENSION ORAL 4 TIMES DAILY
Qty: 1 | Refills: 3 | Status: DISCONTINUED | COMMUNITY
Start: 2020-07-27 | End: 2021-09-24

## 2021-09-24 RX ORDER — SUCRALFATE 1 G/1
1 TABLET ORAL 4 TIMES DAILY
Qty: 120 | Refills: 3 | Status: DISCONTINUED | COMMUNITY
Start: 2020-07-27 | End: 2021-09-24

## 2021-09-24 RX ORDER — PREDNISONE 10 MG/1
10 TABLET ORAL TWICE DAILY
Qty: 15 | Refills: 2 | Status: DISCONTINUED | COMMUNITY
Start: 2020-09-23 | End: 2021-09-24

## 2021-09-26 NOTE — ASSESSMENT
[FreeTextEntry1] : 40 yo F presents to clinic for follow up on her diffuse pain. \par \par #Fibromyalgia: Multiple tender points in the b/l upper thoracic region lateral to the spine as well as in the b/l chest, forearms, arms, hands, thighs. Most consistent with fibromyalgia. Pain improved with Flexeril, but could not tolerate side effects \par -physical therapy referral\par -Tizanidine 2mg a8hrs PRN \par -start duloxetine 20mg/day\par \par #Chest pain: Patient has atypical chest pain, young age, female. Unlikely cardiac especially in the setting of reproducibility. However, given onset post-COVID, occasionally associated with SOB and radiation to left shoulder (occurs at rest), there can be possibility for possible cardiac etiology. Pain evaluation is confounded by fibromyalgia\par -if patient does not improve with above management, will send for cardiology evaluation\par \par Case discussed with Dr. Contreras\par RTC 6 weeks

## 2021-09-26 NOTE — PHYSICAL EXAM
[General Appearance - Alert] : alert [General Appearance - In No Acute Distress] : in no acute distress [General Appearance - Well Nourished] : well nourished [General Appearance - Well Developed] : well developed [Sclera] : the sclera and conjunctiva were normal [PERRL With Normal Accommodation] : pupils were equal in size, round, and reactive to light [Oropharynx] : the oropharynx was normal [Auscultation Breath Sounds / Voice Sounds] : lungs were clear to auscultation bilaterally [Respiration, Rhythm And Depth] : normal respiratory rhythm and effort [Heart Rate And Rhythm] : heart rate was normal and rhythm regular [Heart Sounds] : normal S1 and S2 [Murmurs] : no murmurs [Bowel Sounds] : normal bowel sounds [Edema] : there was no peripheral edema [Abdomen Soft] : soft [Abdomen Tenderness] : non-tender [Nail Clubbing] : no clubbing  or cyanosis of the fingernails [Musculoskeletal - Swelling] : no joint swelling seen [Motor Tone] : muscle strength and tone were normal [] : no rash [Skin Lesions] : no skin lesions [Sensation] : the sensory exam was normal to light touch and pinprick [Motor Exam] : the motor exam was normal [FreeTextEntry1] : No synovitis. Multiple tender points in the b/l chest, forearms, arms, hands, thighs. Negative striaght leg test. Low back pain with NAPOLEON testing b/l
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
## 2021-10-07 ENCOUNTER — NON-APPOINTMENT (OUTPATIENT)
Age: 41
End: 2021-10-07

## 2021-10-30 ENCOUNTER — EMERGENCY (EMERGENCY)
Facility: HOSPITAL | Age: 41
LOS: 0 days | Discharge: HOME | End: 2021-10-30
Attending: EMERGENCY MEDICINE
Payer: COMMERCIAL

## 2021-10-30 VITALS
SYSTOLIC BLOOD PRESSURE: 157 MMHG | RESPIRATION RATE: 18 BRPM | OXYGEN SATURATION: 98 % | TEMPERATURE: 98 F | DIASTOLIC BLOOD PRESSURE: 92 MMHG | HEART RATE: 97 BPM

## 2021-10-30 VITALS — HEIGHT: 66 IN | WEIGHT: 190.04 LBS

## 2021-10-30 DIAGNOSIS — K21.9 GASTRO-ESOPHAGEAL REFLUX DISEASE WITHOUT ESOPHAGITIS: ICD-10-CM

## 2021-10-30 DIAGNOSIS — Z86.2 PERSONAL HISTORY OF DISEASES OF THE BLOOD AND BLOOD-FORMING ORGANS AND CERTAIN DISORDERS INVOLVING THE IMMUNE MECHANISM: ICD-10-CM

## 2021-10-30 DIAGNOSIS — I10 ESSENTIAL (PRIMARY) HYPERTENSION: ICD-10-CM

## 2021-10-30 DIAGNOSIS — R05.9 COUGH, UNSPECIFIED: ICD-10-CM

## 2021-10-30 DIAGNOSIS — Z41.9 ENCOUNTER FOR PROCEDURE FOR PURPOSES OTHER THAN REMEDYING HEALTH STATE, UNSPECIFIED: Chronic | ICD-10-CM

## 2021-10-30 DIAGNOSIS — J32.9 CHRONIC SINUSITIS, UNSPECIFIED: ICD-10-CM

## 2021-10-30 DIAGNOSIS — R09.81 NASAL CONGESTION: ICD-10-CM

## 2021-10-30 DIAGNOSIS — J02.9 ACUTE PHARYNGITIS, UNSPECIFIED: ICD-10-CM

## 2021-10-30 DIAGNOSIS — Z98.51 TUBAL LIGATION STATUS: Chronic | ICD-10-CM

## 2021-10-30 DIAGNOSIS — R06.02 SHORTNESS OF BREATH: ICD-10-CM

## 2021-10-30 DIAGNOSIS — Z20.822 CONTACT WITH AND (SUSPECTED) EXPOSURE TO COVID-19: ICD-10-CM

## 2021-10-30 LAB — SARS-COV-2 RNA SPEC QL NAA+PROBE: SIGNIFICANT CHANGE UP

## 2021-10-30 PROCEDURE — 99283 EMERGENCY DEPT VISIT LOW MDM: CPT | Mod: 25

## 2021-10-30 PROCEDURE — U0005: CPT

## 2021-10-30 PROCEDURE — U0003: CPT

## 2021-10-30 PROCEDURE — 99284 EMERGENCY DEPT VISIT MOD MDM: CPT

## 2021-10-30 PROCEDURE — 71046 X-RAY EXAM CHEST 2 VIEWS: CPT | Mod: 26

## 2021-10-30 PROCEDURE — 71046 X-RAY EXAM CHEST 2 VIEWS: CPT

## 2021-10-30 RX ADMIN — Medication 50 MILLIGRAM(S): at 18:25

## 2021-10-30 RX ADMIN — Medication 1 TABLET(S): at 18:25

## 2021-10-30 NOTE — ED STATDOCS - PATIENT PORTAL LINK FT
You can access the FollowMyHealth Patient Portal offered by [facility] by registering at the following website: [email or web address]. By joining [employer]’s FollowMyHealth portal, you will also be able to view your health information using other applications (apps) compatible with our system.

## 2021-10-30 NOTE — ED STATDOCS - NSICDXPASTMEDICALHX_GEN_ALL_CORE_FT
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

## 2021-10-30 NOTE — ED STATDOCS - ENMT, MLM
Mouth with normal mucosa  Throat has no vesicles, no oropharyngeal exudates and uvula is midline. TTP bilateral frontal sinus, bilateral maxillary sinus. nasal congestion.

## 2021-10-30 NOTE — ED STATDOCS - NSFOLLOWUPINSTRUCTIONS_ED_ALL_ED_FT
Sinusitis    WHAT YOU NEED TO KNOW:    Sinusitis is inflammation or infection of your sinuses. It is most often caused by a virus. Acute sinusitis may last up to 12 weeks. Chronic sinusitis lasts longer than 12 weeks. Recurrent sinusitis means you have 4 or more times in 1 year. Sinuses         DISCHARGE INSTRUCTIONS:    Return to the emergency department if:     Your eye and eyelid are red, swollen, and painful.       You cannot open your eye.       You have vision changes, such as double vision.      Your eyeball bulges out or you cannot move your eye.       You are more sleepy than normal, or you notice changes in your ability to think, move, or talk.      You have a stiff neck, a fever, or a bad headache.       You have swelling of your forehead or scalp.    Contact your healthcare provider if:     Your symptoms do not improve after 3 days.      Your symptoms do not go away after 10 days.      You have nausea and are vomiting.      Your nose is bleeding.      You have questions or concerns about your condition or care.    Medicines: Your symptoms may go away on their own. Your healthcare provider may recommend watchful waiting for up to 10 days before starting antibiotics. You may need any of the following:     Acetaminophen decreases pain and fever. It is available without a doctor's order. Ask how much to take and how often to take it. Follow directions. Read the labels of all other medicines you are using to see if they also contain acetaminophen, or ask your doctor or pharmacist. Acetaminophen can cause liver damage if not taken correctly. Do not use more than 4 grams (4,000 milligrams) total of acetaminophen in one day.       NSAIDs, such as ibuprofen, help decrease swelling, pain, and fever. This medicine is available with or without a doctor's order. NSAIDs can cause stomach bleeding or kidney problems in certain people. If you take blood thinner medicine, always ask your healthcare provider if NSAIDs are safe for you. Always read the medicine label and follow directions.      Nasal steroid sprays may help decrease inflammation in your nose and sinuses.      Decongestants help reduce swelling and drain mucus in the nose and sinuses. They may help you breathe easier.       Antihistamines help dry mucus in the nose and relieve sneezing.       Antibiotics help treat or prevent a bacterial infection.      Take your medicine as directed. Contact your healthcare provider if you think your medicine is not helping or if you have side effects. Tell him or her if you are allergic to any medicine. Keep a list of the medicines, vitamins, and herbs you take. Include the amounts, and when and why you take them. Bring the list or the pill bottles to follow-up visits. Carry your medicine list with you in case of an emergency.    Self-care:     Rinse your sinuses. Use a sinus rinse device to rinse your nasal passages with a saline (salt water) solution or distilled water. Do not use tap water. This will help thin the mucus in your nose and rinse away pollen and dirt. It will also help reduce swelling so you can breathe normally. Ask your healthcare provider how often to do this.       Breathe in steam. Heat a bowl of water until you see steam. Lean over the bowl and make a tent over your head with a large towel. Breathe deeply for about 20 minutes. Be careful not to get too close to the steam or burn yourself. Do this 3 times a day. You can also breathe deeply when you take a hot shower.       Sleep with your head elevated. Place an extra pillow under your head before you go to sleep to help your sinuses drain.       Drink liquids as directed. Ask your healthcare provider how much liquid to drink each day and which liquids are best for you. Liquids will thin the mucus in your nose and help it drain. Avoid drinks that contain alcohol or caffeine.       Do not smoke, and avoid secondhand smoke. Nicotine and other chemicals in cigarettes and cigars can make your symptoms worse. Ask your healthcare provider for information if you currently smoke and need help to quit. E-cigarettes or smokeless tobacco still contain nicotine. Talk to your healthcare provider before you use these products.     Prevent the spread of germs that cause sinusitis: Wash your hands often with soap and water. Wash your hands after you use the bathroom, change a child's diaper, or sneeze. Wash your hands before you prepare or eat food. Handwashing         Follow up with your healthcare provider as directed: You may be referred to an ear, nose, and throat specialist. Write down your questions so you remember to ask them during your visits.    Rest. Drink plenty of fluids.  Take Augmentin as prescribed.  Tylenol 650 mg. PO every 4 hrs. for pain.  Follow up with PMD.

## 2021-10-30 NOTE — ED STATDOCS - OBJECTIVE STATEMENT
41 year old female with PMHx of dislocation of TMJ, HTN, anemia, GERD, dermoid tumor, hiatal hernia presents to the ED 3 c/o cold like sx for 3 weeks. Pt reports headaches, facial pain, nasal congestion, sore throat, cough with phlegm, some SOB. Denies fever, sick contacts. COVID vaccinated.

## 2021-10-30 NOTE — ED STATDOCS - PROGRESS NOTE DETAILS
Reviewed results of Chest X-ray with patient. Agreed to Rx. Augmentin as directed and F/U with PMD at [facility]. Meenangredi NP

## 2021-10-30 NOTE — ED STATDOCS - NSICDXPASTSURGICALHX_GEN_ALL_CORE_FT
PAST SURGICAL HISTORY:  Elective surgery excision of dermoid cyst 2014    Tubal ligation status

## 2021-10-30 NOTE — ED STATDOCS - CARE PROVIDER_API CALL
Hattie Meléndez)  Family Medicine  [address] [place] [address]  Phone: [phone]  Fax: [phone]  Follow Up Time:

## 2021-11-04 NOTE — ED ADULT NURSE NOTE - CHPI ED NUR SYMPTOMS POS
LLE grossly graded 3+ to 4-/5; (R) hip/ankle 3 to 3+/5; (R) knee kept in immobilzer; Please refer to OT giovanni for BUE PAIN

## 2021-11-12 ENCOUNTER — APPOINTMENT (OUTPATIENT)
Dept: RHEUMATOLOGY | Facility: HOSPITAL | Age: 41
End: 2021-11-12
Payer: COMMERCIAL

## 2021-11-12 ENCOUNTER — OUTPATIENT (OUTPATIENT)
Dept: OUTPATIENT SERVICES | Facility: HOSPITAL | Age: 41
LOS: 1 days | End: 2021-11-12
Payer: COMMERCIAL

## 2021-11-12 VITALS
DIASTOLIC BLOOD PRESSURE: 95 MMHG | SYSTOLIC BLOOD PRESSURE: 149 MMHG | WEIGHT: 209 LBS | HEART RATE: 87 BPM | BODY MASS INDEX: 33.59 KG/M2 | TEMPERATURE: 97.9 F | HEIGHT: 66 IN | RESPIRATION RATE: 14 BRPM

## 2021-11-12 DIAGNOSIS — Z41.9 ENCOUNTER FOR PROCEDURE FOR PURPOSES OTHER THAN REMEDYING HEALTH STATE, UNSPECIFIED: Chronic | ICD-10-CM

## 2021-11-12 DIAGNOSIS — M79.7 FIBROMYALGIA: ICD-10-CM

## 2021-11-12 DIAGNOSIS — M06.9 RHEUMATOID ARTHRITIS, UNSPECIFIED: ICD-10-CM

## 2021-11-12 DIAGNOSIS — Z98.51 TUBAL LIGATION STATUS: Chronic | ICD-10-CM

## 2021-11-12 PROCEDURE — 20610 DRAIN/INJ JOINT/BURSA W/O US: CPT

## 2021-11-12 PROCEDURE — 99213 OFFICE O/P EST LOW 20 MIN: CPT | Mod: GC

## 2021-11-12 PROCEDURE — G0463: CPT

## 2021-11-12 RX ORDER — HYDROQUINONE 40 MG/G
4 CREAM TOPICAL
Qty: 1 | Refills: 0 | Status: DISCONTINUED | COMMUNITY
Start: 2021-07-13 | End: 2021-11-12

## 2021-11-12 RX ORDER — TIZANIDINE HYDROCHLORIDE 2 MG/1
2 CAPSULE ORAL
Qty: 90 | Refills: 1 | Status: DISCONTINUED | COMMUNITY
Start: 2021-09-24 | End: 2021-11-12

## 2021-11-12 RX ORDER — CYCLOBENZAPRINE HYDROCHLORIDE 10 MG/1
10 TABLET, FILM COATED ORAL
Qty: 15 | Refills: 0 | Status: DISCONTINUED | COMMUNITY
Start: 2021-06-10 | End: 2021-11-12

## 2021-11-12 RX ORDER — AMITRIPTYLINE HYDROCHLORIDE 10 MG/1
10 TABLET, FILM COATED ORAL
Qty: 30 | Refills: 0 | Status: ACTIVE | COMMUNITY
Start: 2021-11-12 | End: 1900-01-01

## 2021-11-12 RX ORDER — CYCLOBENZAPRINE HYDROCHLORIDE 5 MG/1
5 TABLET, FILM COATED ORAL 3 TIMES DAILY
Qty: 90 | Refills: 0 | Status: DISCONTINUED | COMMUNITY
Start: 2021-07-30 | End: 2021-11-12

## 2021-11-12 RX ORDER — DULOXETINE HYDROCHLORIDE 20 MG/1
20 CAPSULE, DELAYED RELEASE PELLETS ORAL
Qty: 30 | Refills: 1 | Status: DISCONTINUED | COMMUNITY
Start: 2021-09-24 | End: 2021-11-12
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
## 2021-11-12 NOTE — ASSESSMENT
[FreeTextEntry1] : Ms. Flowers is a 40 y/o woman w new dx fibromyalgia since july 2020 here for follow up.

## 2021-11-12 NOTE — END OF VISIT
[] : Resident [FreeTextEntry3] : Patient seen with resident. Fibromyalgia intolerant of duloxetine. Try small dose amitrypiline and PT

## 2021-11-23 DIAGNOSIS — M79.7 FIBROMYALGIA: ICD-10-CM

## 2021-12-25 ENCOUNTER — EMERGENCY (EMERGENCY)
Facility: HOSPITAL | Age: 41
LOS: 0 days | Discharge: HOME | End: 2021-12-26
Attending: EMERGENCY MEDICINE
Payer: COMMERCIAL

## 2021-12-25 VITALS — WEIGHT: 199.96 LBS | HEIGHT: 66 IN

## 2021-12-25 DIAGNOSIS — R11.0 NAUSEA: ICD-10-CM

## 2021-12-25 DIAGNOSIS — Z98.51 TUBAL LIGATION STATUS: Chronic | ICD-10-CM

## 2021-12-25 DIAGNOSIS — G43.909 MIGRAINE, UNSPECIFIED, NOT INTRACTABLE, WITHOUT STATUS MIGRAINOSUS: ICD-10-CM

## 2021-12-25 DIAGNOSIS — Z41.9 ENCOUNTER FOR PROCEDURE FOR PURPOSES OTHER THAN REMEDYING HEALTH STATE, UNSPECIFIED: Chronic | ICD-10-CM

## 2021-12-25 DIAGNOSIS — Z20.822 CONTACT WITH AND (SUSPECTED) EXPOSURE TO COVID-19: ICD-10-CM

## 2021-12-25 LAB
BASOPHILS # BLD AUTO: 0.09 K/UL — SIGNIFICANT CHANGE UP (ref 0–0.2)
BASOPHILS NFR BLD AUTO: 0.7 % — SIGNIFICANT CHANGE UP (ref 0–2)
EOSINOPHIL # BLD AUTO: 0.34 K/UL — SIGNIFICANT CHANGE UP (ref 0–0.5)
EOSINOPHIL NFR BLD AUTO: 2.8 % — SIGNIFICANT CHANGE UP (ref 0–6)
HCT VFR BLD CALC: 37.9 % — SIGNIFICANT CHANGE UP (ref 34.5–45)
HGB BLD-MCNC: 11.9 G/DL — SIGNIFICANT CHANGE UP (ref 11.5–15.5)
IMM GRANULOCYTES NFR BLD AUTO: 0.3 % — SIGNIFICANT CHANGE UP (ref 0–1.5)
LYMPHOCYTES # BLD AUTO: 33.3 % — SIGNIFICANT CHANGE UP (ref 13–44)
LYMPHOCYTES # BLD AUTO: 4.04 K/UL — HIGH (ref 1–3.3)
MCHC RBC-ENTMCNC: 25.5 PG — LOW (ref 27–34)
MCHC RBC-ENTMCNC: 31.4 GM/DL — LOW (ref 32–36)
MCV RBC AUTO: 81.2 FL — SIGNIFICANT CHANGE UP (ref 80–100)
MONOCYTES # BLD AUTO: 0.93 K/UL — HIGH (ref 0–0.9)
MONOCYTES NFR BLD AUTO: 7.7 % — SIGNIFICANT CHANGE UP (ref 2–14)
NEUTROPHILS # BLD AUTO: 6.68 K/UL — SIGNIFICANT CHANGE UP (ref 1.8–7.4)
NEUTROPHILS NFR BLD AUTO: 55.2 % — SIGNIFICANT CHANGE UP (ref 43–77)
PLATELET # BLD AUTO: 379 K/UL — SIGNIFICANT CHANGE UP (ref 150–400)
RBC # BLD: 4.67 M/UL — SIGNIFICANT CHANGE UP (ref 3.8–5.2)
RBC # FLD: 14.8 % — HIGH (ref 10.3–14.5)
WBC # BLD: 12.12 K/UL — HIGH (ref 3.8–10.5)
WBC # FLD AUTO: 12.12 K/UL — HIGH (ref 3.8–10.5)

## 2021-12-25 PROCEDURE — 85025 COMPLETE CBC W/AUTO DIFF WBC: CPT

## 2021-12-25 PROCEDURE — 81025 URINE PREGNANCY TEST: CPT

## 2021-12-25 PROCEDURE — U0003: CPT

## 2021-12-25 PROCEDURE — 96375 TX/PRO/DX INJ NEW DRUG ADDON: CPT

## 2021-12-25 PROCEDURE — 99284 EMERGENCY DEPT VISIT MOD MDM: CPT | Mod: 25

## 2021-12-25 PROCEDURE — 96374 THER/PROPH/DIAG INJ IV PUSH: CPT

## 2021-12-25 PROCEDURE — 93010 ELECTROCARDIOGRAM REPORT: CPT

## 2021-12-25 PROCEDURE — 81001 URINALYSIS AUTO W/SCOPE: CPT

## 2021-12-25 PROCEDURE — 99283 EMERGENCY DEPT VISIT LOW MDM: CPT

## 2021-12-25 PROCEDURE — 80048 BASIC METABOLIC PNL TOTAL CA: CPT

## 2021-12-25 PROCEDURE — 93005 ELECTROCARDIOGRAM TRACING: CPT

## 2021-12-25 PROCEDURE — 36415 COLL VENOUS BLD VENIPUNCTURE: CPT

## 2021-12-25 PROCEDURE — U0005: CPT

## 2021-12-25 RX ORDER — SODIUM CHLORIDE 9 MG/ML
1000 INJECTION INTRAMUSCULAR; INTRAVENOUS; SUBCUTANEOUS ONCE
Refills: 0 | Status: COMPLETED | OUTPATIENT
Start: 2021-12-25 | End: 2021-12-25

## 2021-12-25 RX ORDER — DIPHENHYDRAMINE HCL 50 MG
50 CAPSULE ORAL ONCE
Refills: 0 | Status: COMPLETED | OUTPATIENT
Start: 2021-12-25 | End: 2021-12-25

## 2021-12-25 RX ORDER — METOCLOPRAMIDE HCL 10 MG
10 TABLET ORAL ONCE
Refills: 0 | Status: COMPLETED | OUTPATIENT
Start: 2021-12-25 | End: 2021-12-25

## 2021-12-25 NOTE — ED ADULT TRIAGE NOTE - CHIEF COMPLAINT QUOTE
Pt. presents to ED c/o migraine that began yesterday. Pt. has taken tylenol with no relief. Pt. reports pain to right side of head

## 2021-12-25 NOTE — ED ADULT NURSE NOTE - OBJECTIVE STATEMENT
Pt. presents to ED c/o migraine that began two days ago. Pt. has taken tylenol with no relief. Pt. reports pain that started in center to right side of head, but also  slightly radiates down to the neck.

## 2021-12-26 VITALS
OXYGEN SATURATION: 100 % | DIASTOLIC BLOOD PRESSURE: 82 MMHG | TEMPERATURE: 98 F | HEART RATE: 91 BPM | SYSTOLIC BLOOD PRESSURE: 146 MMHG | RESPIRATION RATE: 18 BRPM

## 2021-12-26 LAB
ANION GAP SERPL CALC-SCNC: 6 MMOL/L — SIGNIFICANT CHANGE UP (ref 5–17)
APPEARANCE UR: CLEAR — SIGNIFICANT CHANGE UP
BILIRUB UR-MCNC: NEGATIVE — SIGNIFICANT CHANGE UP
BUN SERPL-MCNC: 14 MG/DL — SIGNIFICANT CHANGE UP (ref 7–23)
CALCIUM SERPL-MCNC: 8.9 MG/DL — SIGNIFICANT CHANGE UP (ref 8.5–10.1)
CHLORIDE SERPL-SCNC: 110 MMOL/L — HIGH (ref 96–108)
CO2 SERPL-SCNC: 25 MMOL/L — SIGNIFICANT CHANGE UP (ref 22–31)
COLOR SPEC: YELLOW — SIGNIFICANT CHANGE UP
CREAT SERPL-MCNC: 0.89 MG/DL — SIGNIFICANT CHANGE UP (ref 0.5–1.3)
DIFF PNL FLD: ABNORMAL
GLUCOSE SERPL-MCNC: 113 MG/DL — HIGH (ref 70–99)
GLUCOSE UR QL: NEGATIVE MG/DL — SIGNIFICANT CHANGE UP
KETONES UR-MCNC: NEGATIVE — SIGNIFICANT CHANGE UP
LEUKOCYTE ESTERASE UR-ACNC: NEGATIVE — SIGNIFICANT CHANGE UP
NITRITE UR-MCNC: NEGATIVE — SIGNIFICANT CHANGE UP
PH UR: 7 — SIGNIFICANT CHANGE UP (ref 5–8)
POTASSIUM SERPL-MCNC: 3.6 MMOL/L — SIGNIFICANT CHANGE UP (ref 3.5–5.3)
POTASSIUM SERPL-SCNC: 3.6 MMOL/L — SIGNIFICANT CHANGE UP (ref 3.5–5.3)
PROT UR-MCNC: NEGATIVE MG/DL — SIGNIFICANT CHANGE UP
SODIUM SERPL-SCNC: 141 MMOL/L — SIGNIFICANT CHANGE UP (ref 135–145)
SP GR SPEC: 1 — LOW (ref 1.01–1.02)
UROBILINOGEN FLD QL: NEGATIVE MG/DL — SIGNIFICANT CHANGE UP

## 2021-12-26 RX ORDER — DIAZEPAM 5 MG
5 TABLET ORAL ONCE
Refills: 0 | Status: DISCONTINUED | OUTPATIENT
Start: 2021-12-26 | End: 2021-12-26

## 2021-12-26 RX ORDER — KETOROLAC TROMETHAMINE 30 MG/ML
30 SYRINGE (ML) INJECTION ONCE
Refills: 0 | Status: DISCONTINUED | OUTPATIENT
Start: 2021-12-26 | End: 2021-12-26

## 2021-12-26 RX ADMIN — SODIUM CHLORIDE 2000 MILLILITER(S): 9 INJECTION INTRAMUSCULAR; INTRAVENOUS; SUBCUTANEOUS at 00:00

## 2021-12-26 RX ADMIN — Medication 50 MILLIGRAM(S): at 00:02

## 2021-12-26 RX ADMIN — Medication 5 MILLIGRAM(S): at 02:21

## 2021-12-26 RX ADMIN — Medication 10 MILLIGRAM(S): at 00:05

## 2021-12-26 RX ADMIN — Medication 30 MILLIGRAM(S): at 02:21

## 2021-12-26 NOTE — ED PROVIDER NOTE - CARE PROVIDER_API CALL
Jerod Escamilla)  Internal Medicine; Neurology  [address] [facility], [address]  [place] [address]  Phone: [phone]  Fax: [phone]  Follow Up Time:

## 2021-12-26 NOTE — ED PROVIDER NOTE - OBJECTIVE STATEMENT
40 yo female with ho migraines, c/o migraine for 2 daZys not improving with ibuprofen, + nausea, no vomiting, no ho trauma, had ct head 2 to 3 years ago.

## 2021-12-26 NOTE — ED PROVIDER NOTE - NSFOLLOWUPINSTRUCTIONS_ED_ALL_ED_FT
Cefalea migrañosa    Migraine Headache      Mery cefalea migrañosa es un dolor muy intenso y punzante en annette o ambos lados de la sonia. Susan tipo de dolor de sonia también puede causar otros síntomas. Puede durar desde 4 horas hasta 3 días. Hable con conley médico sobre las cosas que pueden causar (desencadenar) esta afección.      ¿Cuáles son las causas?  Se desconoce la causa exacta de esta afección. Esta afección puede desencadenarse o ser causada por lo siguiente:  •Consumo de alcohol.      •Consumo de cigarrillos.    •Vanessa medicamentos katie por ejemplo:  •Medicamentos para aliviar el dolor torácico (nitroglicerina).      •Anticonceptivos orales.      •Estrógeno.      •Algunos medicamentos para la presión arterial.        •Kamrar o beber ciertos productos.      •Hacer actividad física.    Otros factores que pueden provocar cefalea migrañosa son los siguientes:  •Tener el período menstrual.      •Embarazo.      •Hambre.      •Estrés.      •No dormir lo suficiente o dormir demasiado.      •Cambios climáticos.      •Cansancio (fatiga).        ¿Qué incrementa el riesgo?    •Tener entre 25 y 55 años de edad.      •Ser leticia.      •Tener antecedentes familiares de cefalea migrañosa.      •Ser de arleth caucásica.      •Tener depresión o ansiedad.      •Tener mucho sobrepeso.        ¿Cuáles son los signos o los síntomas?  •Un dolor punzante. Susan dolor puede tener las siguientes características:  •Puede aparecer en cualquier región de la sonia, tanto de un lado katie de ambos.      •Puede dificultar las actividades cotidianas.      •Puede empeorar con la actividad física.      •Puede empeorar con las luces brillantes o los ruidos juan.      •Otros síntomas pueden incluir:  •Ganas de vomitar (náuseas).      •Vómitos.      •Mareos.      •Sensibilidad a las luces brillantes, los ruidos juan o los olores.      •Antes de tener mery cefalea migrañosa, puede recibir señales de advertencia (aura). Un aura puede incluir:  •Anali luces intermitentes o tener puntos ciegos.      •Anali puntos brillantes, halos o líneas en zigzag.      •Tener mery visión en túnel o visión borrosa.      •Sentir entumecimiento u hormigueo.      •Tener dificultad para hablar.      •Tener músculos débiles.      •Algunas personas tienen síntomas después de mery cefalea migrañosa (fase posdromal), katie los siguientes:  •Cansancio.      •Dificultad para pensar (concentrarse).          ¿Cómo se trata?  •Vanessa medicamentos para:  •Aliviar el dolor.      •Aliviar la sensación de malestar estomacal.      •Prevenir las cefaleas migrañosas.      •El tratamiento también puede incluir lo siguiente:  •Vanessa sesiones de acupuntura.      •Evitar los alimentos que provocan las cefaleas migrañosas.      •Aprender maneras de controlar las funciones corporales (biorretroalimentación).      •Terapia para ayudarlo a conocer y lidiar con los pensamientos negativos (terapia cognitivo conductual).          Siga estas instrucciones en conley casa:    Medicamentos     •[place] los medicamentos de venta luciano y los recetados solamente katie se lo haya indicado el médico.    •Consulte a conley médico si el medicamento que le recetaron:  •Hace que sea necesario que evite conducir o usar maquinaria pesada.    •Puede causarle dificultad para defecar (estreñimiento). Es posible que deba vanessa estas medidas para prevenir o tratar los problemas para defecar:  •Beber suficiente líquido para mantener el pis (la orina) de color amarillo pálido.      •Vanessa medicamentos recetados o de venta luciano.      •Kamrar alimentos ricos en fibra. Entre ellos, frijoles, cereales integrales y frutas y verduras frescas.      •Limitar los alimentos con alto contenido de grasa y azúcar. Estos incluyen alimentos fritos o dulces.          Estilo de glenroy     • No isela alcohol.      • No consuma ningún producto que contenga nicotina o tabaco, katie cigarrillos, cigarrillos electrónicos y tabaco de mascar. Si necesita ayuda para dejar de fumar, consulte al médico.      •Duerma katie mínimo 8 horas todas las noches.      •Limite el estrés y manéjelo.        Indicaciones generales                 •Lleve un registro diario para averiguar lo que puede provocar las cefaleas migrañosas. Registre, por ejemplo, lo siguiente:  •Lo que usted come y rony.      •El tiempo que duerme.      •Algún cambio en lo que come o rony.      •Algún cambio en rahat medicamentos.      •Si tiene mery cefalea migrañosa:  •Evite los factores que empeoren los síntomas, katie las luces brillantes.      •Resulta útil acostarse en mery habitación oscura y silenciosa.      •No conduzca vehículos ni opere maquinaria pesada.      •Pregúntele al médico qué actividades son seguras para usted.        •Concurra a todas las visitas de seguimiento katie se lo haya indicado el médico. [place] es importante.        Comuníquese con un médico si:    •Tiene mery cefalea migrañosa que es diferente o peor que otras que ha tenido.      •Tiene más de 15 días de cefalea por mes.        Solicite ayuda inmediatamente si:    •La cefalea migrañosa empeora mucho.      •La cefalea migrañosa dura más de 72 horas.      •Tiene fiebre.      •Presenta rigidez en el tamara.      •Tiene dificultad para anali.      •Siente debilidad en los músculos o que no puede controlarlos.      •Comienza a perder el equilibrio continuamente.      •Comienza a tener dificultad para caminar.      •Pierde el conocimiento (se desmaya).      •Tiene mery convulsión.        Resumen    •Mery cefalea migrañosa es un dolor muy intenso y punzante en annette o ambos lados de la sonia. Estos arcadio de sonia también pueden causar otros síntomas.      •Esta afección puede tratarse con medicamentos y cambios en el estilo de glenroy.      •Lleve un registro diario para averiguar lo que puede provocar las cefaleas migrañosas.      •Comuníquese con un médico si tiene mery cefalea migrañosa que es diferente o peor que otras que ha tenido.      •Comuníquese con el médico si tiene más de 15 días de cefalea en un mes.      Esta información no tiene katie fin reemplazar el consejo del médico. Asegúrese de hacerle al médico cualquier pregunta que tenga.      Document Revised: 02/28/2020 Document Reviewed: 02/28/2020    Elsevier Patient Education © 2021 [employer]

## 2021-12-26 NOTE — ED PROVIDER NOTE - PROGRESS NOTE DETAILS
pt feeling better, will give, toradol for residual ha and valium for muscle spasm in neck and d/c B Xochitl RAMIREZ

## 2021-12-26 NOTE — ED PROVIDER NOTE - NSICDXPASTSURGICALHX_GEN_ALL_CORE_FT
PAST SURGICAL HISTORY:  Elective surgery excision of dermoid cyst 2014    Tubal ligation status

## 2021-12-26 NOTE — ED PROVIDER NOTE - PATIENT PORTAL LINK FT
You can access the FollowMyHealth Patient Portal offered by [facility] by registering at the following website: [email or web address]. By joining [employer]’s FollowMyHealth portal, you will also be able to view your health information using other applications (apps) compatible with our system.

## 2022-02-13 ENCOUNTER — RX RENEWAL (OUTPATIENT)
Age: 42
End: 2022-02-13

## 2022-03-03 ENCOUNTER — APPOINTMENT (OUTPATIENT)
Dept: GASTROENTEROLOGY | Facility: CLINIC | Age: 42
End: 2022-03-03

## 2022-05-02 NOTE — ASU PATIENT PROFILE, ADULT - PAIN RATING AT REST
Need for follow-up by  Need for follow-up by  Need for follow-up by  0 Need for follow-up by  Need for follow-up by  Need for follow-up by  Need for follow-up by  Prophylactic measure

## 2022-05-10 NOTE — ED ADULT NURSE NOTE - HOW OFTEN DO YOU HAVE A DRINK CONTAINING ALCOHOL?
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 She stopped exercising, gained weight.\par \par She c/o increased BELLAMY now and episodes of left-sided chest discomfort at night. She c/o increased fatigue when she takes Metoprolol and wants to switch to Bisoprolol (she used to take it and felt better).\par \par . Never

## 2022-05-25 ENCOUNTER — RX CHANGE (OUTPATIENT)
Age: 42
End: 2022-05-25

## 2022-05-26 ENCOUNTER — RX CHANGE (OUTPATIENT)
Age: 42
End: 2022-05-26

## 2022-05-26 RX ORDER — PANTOPRAZOLE 40 MG/1
40 TABLET, DELAYED RELEASE ORAL
Qty: 30 | Refills: 5 | Status: ACTIVE | COMMUNITY
Start: 2020-07-27 | End: 1900-01-01

## 2022-06-02 ENCOUNTER — NON-APPOINTMENT (OUTPATIENT)
Age: 42
End: 2022-06-02

## 2022-09-18 ENCOUNTER — NON-APPOINTMENT (OUTPATIENT)
Age: 42
End: 2022-09-18

## 2022-09-19 ENCOUNTER — EMERGENCY (EMERGENCY)
Facility: HOSPITAL | Age: 42
LOS: 0 days | Discharge: HOME | End: 2022-09-20
Attending: EMERGENCY MEDICINE
Payer: COMMERCIAL

## 2022-09-19 VITALS — HEIGHT: 66 IN | WEIGHT: 197.98 LBS

## 2022-09-19 DIAGNOSIS — R10.13 EPIGASTRIC PAIN: ICD-10-CM

## 2022-09-19 DIAGNOSIS — Z41.9 ENCOUNTER FOR PROCEDURE FOR PURPOSES OTHER THAN REMEDYING HEALTH STATE, UNSPECIFIED: Chronic | ICD-10-CM

## 2022-09-19 DIAGNOSIS — R11.0 NAUSEA: ICD-10-CM

## 2022-09-19 DIAGNOSIS — Z98.51 TUBAL LIGATION STATUS: ICD-10-CM

## 2022-09-19 DIAGNOSIS — R10.9 UNSPECIFIED ABDOMINAL PAIN: ICD-10-CM

## 2022-09-19 DIAGNOSIS — R19.7 DIARRHEA, UNSPECIFIED: ICD-10-CM

## 2022-09-19 DIAGNOSIS — Z20.822 CONTACT WITH AND (SUSPECTED) EXPOSURE TO COVID-19: ICD-10-CM

## 2022-09-19 DIAGNOSIS — R73.03 PREDIABETES: ICD-10-CM

## 2022-09-19 DIAGNOSIS — Z87.19 PERSONAL HISTORY OF OTHER DISEASES OF THE DIGESTIVE SYSTEM: ICD-10-CM

## 2022-09-19 DIAGNOSIS — R10.31 RIGHT LOWER QUADRANT PAIN: ICD-10-CM

## 2022-09-19 DIAGNOSIS — Z98.51 TUBAL LIGATION STATUS: Chronic | ICD-10-CM

## 2022-09-19 LAB
ALBUMIN SERPL ELPH-MCNC: 3.4 G/DL — SIGNIFICANT CHANGE UP (ref 3.3–5)
ALP SERPL-CCNC: 107 U/L — SIGNIFICANT CHANGE UP (ref 40–120)
ALT FLD-CCNC: 37 U/L — SIGNIFICANT CHANGE UP (ref 12–78)
ANION GAP SERPL CALC-SCNC: 7 MMOL/L — SIGNIFICANT CHANGE UP (ref 5–17)
APPEARANCE UR: CLEAR — SIGNIFICANT CHANGE UP
AST SERPL-CCNC: 31 U/L — SIGNIFICANT CHANGE UP (ref 15–37)
BASOPHILS # BLD AUTO: 0.04 K/UL — SIGNIFICANT CHANGE UP (ref 0–0.2)
BASOPHILS NFR BLD AUTO: 0.4 % — SIGNIFICANT CHANGE UP (ref 0–2)
BILIRUB SERPL-MCNC: 0.4 MG/DL — SIGNIFICANT CHANGE UP (ref 0.2–1.2)
BILIRUB UR-MCNC: NEGATIVE — SIGNIFICANT CHANGE UP
BUN SERPL-MCNC: 11 MG/DL — SIGNIFICANT CHANGE UP (ref 7–23)
CALCIUM SERPL-MCNC: 8.8 MG/DL — SIGNIFICANT CHANGE UP (ref 8.5–10.1)
CHLORIDE SERPL-SCNC: 110 MMOL/L — HIGH (ref 96–108)
CO2 SERPL-SCNC: 22 MMOL/L — SIGNIFICANT CHANGE UP (ref 22–31)
COLOR SPEC: YELLOW — SIGNIFICANT CHANGE UP
CREAT SERPL-MCNC: 0.89 MG/DL — SIGNIFICANT CHANGE UP (ref 0.5–1.3)
DIFF PNL FLD: ABNORMAL
EGFR: 83 ML/MIN/1.73M2 — SIGNIFICANT CHANGE UP
EOSINOPHIL # BLD AUTO: 0.19 K/UL — SIGNIFICANT CHANGE UP (ref 0–0.5)
EOSINOPHIL NFR BLD AUTO: 1.7 % — SIGNIFICANT CHANGE UP (ref 0–6)
GLUCOSE SERPL-MCNC: 100 MG/DL — HIGH (ref 70–99)
GLUCOSE UR QL: NEGATIVE — SIGNIFICANT CHANGE UP
HCT VFR BLD CALC: 40.4 % — SIGNIFICANT CHANGE UP (ref 34.5–45)
HGB BLD-MCNC: 12.3 G/DL — SIGNIFICANT CHANGE UP (ref 11.5–15.5)
IMM GRANULOCYTES NFR BLD AUTO: 0.4 % — SIGNIFICANT CHANGE UP (ref 0–0.9)
KETONES UR-MCNC: ABNORMAL
LEUKOCYTE ESTERASE UR-ACNC: ABNORMAL
LIDOCAIN IGE QN: 157 U/L — SIGNIFICANT CHANGE UP (ref 73–393)
LYMPHOCYTES # BLD AUTO: 2.75 K/UL — SIGNIFICANT CHANGE UP (ref 1–3.3)
LYMPHOCYTES # BLD AUTO: 24.4 % — SIGNIFICANT CHANGE UP (ref 13–44)
MCHC RBC-ENTMCNC: 24.2 PG — LOW (ref 27–34)
MCHC RBC-ENTMCNC: 30.4 GM/DL — LOW (ref 32–36)
MCV RBC AUTO: 79.5 FL — LOW (ref 80–100)
MONOCYTES # BLD AUTO: 1.2 K/UL — HIGH (ref 0–0.9)
MONOCYTES NFR BLD AUTO: 10.7 % — SIGNIFICANT CHANGE UP (ref 2–14)
NEUTROPHILS # BLD AUTO: 7.03 K/UL — SIGNIFICANT CHANGE UP (ref 1.8–7.4)
NEUTROPHILS NFR BLD AUTO: 62.4 % — SIGNIFICANT CHANGE UP (ref 43–77)
NITRITE UR-MCNC: NEGATIVE — SIGNIFICANT CHANGE UP
PH UR: 5 — SIGNIFICANT CHANGE UP (ref 5–8)
PLATELET # BLD AUTO: 344 K/UL — SIGNIFICANT CHANGE UP (ref 150–400)
POTASSIUM SERPL-MCNC: 3.7 MMOL/L — SIGNIFICANT CHANGE UP (ref 3.5–5.3)
POTASSIUM SERPL-SCNC: 3.7 MMOL/L — SIGNIFICANT CHANGE UP (ref 3.5–5.3)
PROT SERPL-MCNC: 7.3 GM/DL — SIGNIFICANT CHANGE UP (ref 6–8.3)
PROT UR-MCNC: NEGATIVE — SIGNIFICANT CHANGE UP
RBC # BLD: 5.08 M/UL — SIGNIFICANT CHANGE UP (ref 3.8–5.2)
RBC # FLD: 16.8 % — HIGH (ref 10.3–14.5)
SODIUM SERPL-SCNC: 139 MMOL/L — SIGNIFICANT CHANGE UP (ref 135–145)
SP GR SPEC: 1.02 — SIGNIFICANT CHANGE UP (ref 1.01–1.02)
UROBILINOGEN FLD QL: NEGATIVE — SIGNIFICANT CHANGE UP
WBC # BLD: 11.25 K/UL — HIGH (ref 3.8–10.5)
WBC # FLD AUTO: 11.25 K/UL — HIGH (ref 3.8–10.5)

## 2022-09-19 PROCEDURE — 96376 TX/PRO/DX INJ SAME DRUG ADON: CPT

## 2022-09-19 PROCEDURE — 99284 EMERGENCY DEPT VISIT MOD MDM: CPT | Mod: 25

## 2022-09-19 PROCEDURE — 99285 EMERGENCY DEPT VISIT HI MDM: CPT

## 2022-09-19 PROCEDURE — 74177 CT ABD & PELVIS W/CONTRAST: CPT | Mod: 26,MA

## 2022-09-19 PROCEDURE — 80053 COMPREHEN METABOLIC PANEL: CPT

## 2022-09-19 PROCEDURE — 74177 CT ABD & PELVIS W/CONTRAST: CPT | Mod: MA

## 2022-09-19 PROCEDURE — 81001 URINALYSIS AUTO W/SCOPE: CPT

## 2022-09-19 PROCEDURE — U0003: CPT

## 2022-09-19 PROCEDURE — 36415 COLL VENOUS BLD VENIPUNCTURE: CPT

## 2022-09-19 PROCEDURE — 85025 COMPLETE CBC W/AUTO DIFF WBC: CPT

## 2022-09-19 PROCEDURE — 83690 ASSAY OF LIPASE: CPT

## 2022-09-19 PROCEDURE — 96374 THER/PROPH/DIAG INJ IV PUSH: CPT | Mod: XU

## 2022-09-19 PROCEDURE — 96375 TX/PRO/DX INJ NEW DRUG ADDON: CPT

## 2022-09-19 PROCEDURE — U0005: CPT

## 2022-09-19 RX ORDER — ONDANSETRON 8 MG/1
4 TABLET, FILM COATED ORAL ONCE
Refills: 0 | Status: COMPLETED | OUTPATIENT
Start: 2022-09-19 | End: 2022-09-19

## 2022-09-19 RX ORDER — KETOROLAC TROMETHAMINE 30 MG/ML
15 SYRINGE (ML) INJECTION ONCE
Refills: 0 | Status: DISCONTINUED | OUTPATIENT
Start: 2022-09-19 | End: 2022-09-19

## 2022-09-19 RX ORDER — FAMOTIDINE 10 MG/ML
20 INJECTION INTRAVENOUS ONCE
Refills: 0 | Status: COMPLETED | OUTPATIENT
Start: 2022-09-19 | End: 2022-09-19

## 2022-09-19 RX ORDER — SODIUM CHLORIDE 9 MG/ML
1000 INJECTION INTRAMUSCULAR; INTRAVENOUS; SUBCUTANEOUS ONCE
Refills: 0 | Status: COMPLETED | OUTPATIENT
Start: 2022-09-19 | End: 2022-09-19

## 2022-09-19 RX ADMIN — SODIUM CHLORIDE 2000 MILLILITER(S): 9 INJECTION INTRAMUSCULAR; INTRAVENOUS; SUBCUTANEOUS at 22:13

## 2022-09-19 RX ADMIN — Medication 15 MILLIGRAM(S): at 23:23

## 2022-09-19 RX ADMIN — FAMOTIDINE 20 MILLIGRAM(S): 10 INJECTION INTRAVENOUS at 22:17

## 2022-09-19 RX ADMIN — ONDANSETRON 4 MILLIGRAM(S): 8 TABLET, FILM COATED ORAL at 22:18

## 2022-09-19 RX ADMIN — Medication 15 MILLIGRAM(S): at 23:08

## 2022-09-19 NOTE — ED ADULT TRIAGE NOTE - CHIEF COMPLAINT QUOTE
c/o stomach pain since Friday with nausea and diarrhea. went to urgent care this morning and was given Zantac with no relief.

## 2022-09-19 NOTE — ED PROVIDER NOTE - PATIENT PORTAL LINK FT
You can access the FollowMyHealth Patient Portal offered by [facility] by registering at the following website: [email or web address]. By joining [facility]’s FollowMyHealth portal, you will also be able to view your health information using other applications (apps) compatible with our system.

## 2022-09-19 NOTE — ED ADULT NURSE NOTE - OBJECTIVE STATEMENT
Pt c/o stomach pain since Friday with nausea and diarrhea. went to urgent care this morning and was given Zantac with no relief. No s/s of distress.

## 2022-09-19 NOTE — ED PROVIDER NOTE - PHYSICAL EXAMINATION
Constitutional: NAD, well appearing  HEENT: no rhinorrhea  CVS:  RRR, no m/r/g  Resp:  CTAB  GI: soft, moderate ttp to RLQ and mid epigastric region  MSK:  no restriction to rom, full ROM to all extremities  Neuro:  A&Ox3, moving all extremities equally  Skin: no rash  psych: clear thought content  Heme/lymph:  No LAD

## 2022-09-19 NOTE — ED PROVIDER NOTE - PROGRESS NOTE DETAILS
results reviewed with pt and family at bedside, pt states she still has a little pain , toradol ordered will d/c to fu with pcp JOHN Leung DO

## 2022-09-19 NOTE — ED ADULT NURSE NOTE - VOIDING
-- DO NOT REPLY / DO NOT REPLY ALL --  -- Message is from [facility] [employer] ([facility]) --    General Patient Message :  Gillian called from Urologist office to request  Patients most recent Lab     Caller Information       Type Contact Phone/Fax    08/11/2022 09:25 AM CDT Phone (Incoming) Gillian (Other) [phone]        Fax:  [phone]    Can a detailed message be left? No    Message Turnaround:     Did the caller agree that this message can wait until the office reopens in the morning? YES - The Message Can Wait - Send a message to the provider's clinical support pool     [place]:    Please give this turnaround time to the caller:   \"This message will be sent to [state Provider's name]. The clinical team will fulfill your request as soon as they review your message.\"                
Labs faxed  
without difficulty

## 2022-09-19 NOTE — ED PROVIDER NOTE - CLINICAL SUMMARY MEDICAL DECISION MAKING FREE TEXT BOX
Pt well appearing.  Considered biliary pathology vs gerd/gastritis/pancreatitis, but pain more to RLQ region.  No reported bone ingestion.  Will ct to eval for appy vs tanna.  Dispo pending labs, imaging, reassessment.

## 2022-09-19 NOTE — ED PROVIDER NOTE - NSFOLLOWUPINSTRUCTIONS_ED_ALL_ED_FT
Dolor abdominal ajke    LO QUE NECESITA SABER:    ¿Qué necesito saber del dolor abdominal jake?Generalmente, el dolor abdominal jake comienza repentinamente y empeora rápidamente.  Órganos abdominales         ¿Qué causa el dolor abdominal jake?Las siguientes son las causas más comunes:  •Un ataque al corazón      •Mery reacción alérgica a alimentos o intoxicación por alimentos      •Reflujo gástrico      •Estrés      •Estreñimiento o mery obstrucción en los intestinos      •Dolor del periodo menstrual en las mujeres      •Inflamación o ruptura de tate apéndice      •Inflamación o infección en el abdomen o un órgano      •Mery úlcera o un desgarre en el esófago, el estómago o los intestinos      •Sangrado en el abdomen o un órgano      •Cálculos en el riñón o la vesícula biliar      •En las mujeres, enfermedades de las trompas de [place] o de los ovarios, o un embarazo ectópico      ¿Cómo se diagnostica la causa del dolor abdominal jake?Tate médico lo examinará y le hará preguntas acerca de bella síntomas. Informe al médico cuándo comenzaron bella síntomas y sobre cualquier viaje o cirugía recientes. También dígale qué mejora o empeora el dolor. Con base en lo que encuentre el médico en el examen y bella síntomas, puede que necesite alguno de los siguientes:  •Los análisis de sangrepueden hacerse para revisar si tiene inflamación o para obtener información sobre tate gabi en general.      •Mery muestra de materia fecalpuede examinarse para anali si usted absorbe los nutrientes de los alimentos que consume. También se puede examinar para detectar gérmenes que pueden estar causando dolor.      •Radiografía, ultrasonido, tomografía computarizada o imagen por resonancia magnética (IRM)podrían usarse para revisar los órganos dentro de tate abdomen. A usted le pueden damari un líquido de contraste para que los órganos se aprecien mejor en las imágenes. Dígale al médico si usted alguna vez ha tenido mery reacción alérgica al líquido de contraste. No entre a la jennifer donde se realiza la resonancia magnética con algo de metal. El metal puede causar lesiones serias. Dígale al médico si usted tiene algo de metal dentro de tate cuerpo o por encima.      •Mery endoscopiaes un examen para observar el interior del esófago, el estómago y el intestino swan. Ofelia mery endoscopia, los médicos pueden encontrar problemas en el esófago, el estómago o el intestino swan. Algunos problemas se pueden arreglar ofelia la endoscopia. Pueden tomarse muestras de tate esófago, estómago o intestino swan para enviarlas a un laboratorio para tate examinación.  Endoscopia superior           •Mery colonoscopiaes un examen para observar dentro del colon. Ofelia mery colonoscopia, los médicos pueden encontrar problemas en el colon. Algunos problemas se pueden arreglar ofelia la colonoscopia. Pueden tomarse muestras de tate colon para enviarlas a un laboratorio para tate examinación.             •Un electrocardiogramagraba tate ritmo cardíaco y la rapidez con la que late tate corazón. Se usa para revisar si hay daños en el corazón.      ¿Cómo se trata el dolor abdominal jake?El tratamiento depende de la causa del dolor abdominal. Es posible que usted necesite alguno de los siguientes:  •Los medicamentosestos pueden administrarse para disminuir el dolor, tratar mery infección y manejar bella síntomas, tales katie el estreñimiento.      •La cirugíapuede necesitarse para tratar causas graves del dolor abdominal. Los ejemplos incluyen cirugías para tratar mery apendicitis o mery obstrucción en los intestinos.      ¿Qué puedo hacer para controlar el dolor abdominal jake?  •Aplique calorsobre el abdomen de 20 a 30 minutos cada 2 horas por los días que le indiquen. El calor ayuda a disminuir el dolor y los espasmos musculares.      •Mantenga un registro de tate dolor abdominal.[place] puede ayudar a tate médico a saber lo que está causando tate dolor. Incluya cuándo ocurre el dolor, cuánto dura y la sensación causada por el dolor. Registre cualquier otro síntoma que tenga además del dolor abdominal. También registre lo que coma y cualquier síntoma que tenga después de comer.      ¿Qué puedo hacer para prevenir el dolor abdominal jake?  •Realice cambios en los alimentos que consuma, de ser necesario.No coma alimentos que causan dolor abdominal u otros síntomas. Ingiera comidas pequeñas, más a menudo. Los siguientes cambios también pueden ayudar:?Coma más alimentos ricos en fibra si tiene estreñimiento.Los alimentos altos en fibra incluyen frutas, verduras, alimentos de grano integral y legumbres, katie frijoles pintos.             ?No coma alimentos que causan gas si tiene distensión.Por ejemplo, brócoli, repollo, frijoles y bebidas carbonatadas.      ?No consuma alimentos o bebidas que contienen sorbitol o fructosa si tiene diarrea o distensión.Algunos ejemplos son jugos de frutas, dulces, mermeladas y gomas de mascar sin azúcar.      ?No consuma alimentos altos en grasa.Por ejemplo, comidas fritas, hamburguesas con queso, perros calientes y postres.      •Realice cambios en los líquidos que tome, de ser necesario.No tome líquidos que le causen dolor o lo empeoren, katie el jugo de naranja. [place] líquidos ofelia el día para mantenerse hidratado. Los siguientes cambios también pueden ayudar:?Linda suficientes líquidos para evitar la deshidratación causada por la diarrea o los vómitos.Pregunte a tate médico sobre la cantidad de líquido que necesita vanessa todos los karla y cuáles le recomienda.      ?Limite o no tome cafeína.La cafeína puede empeorar los síntomas, katie la acidez o las náuseas.      ?Limite o no consuma bebidas alcohólicas.El alcohol puede empeorar el dolor abdominal. Pregúntele a tate médico si está yared que usted consuma alcohol. Pregunte qué cantidad puede beber. Mery bebida de alcohol equivale a 12 onzas de cerveza, ½ onza de licor o 5 onzas de vino.      •Controle el estrés.El estrés puede causar dolor abdominal. Tate médico puede recomendarle técnicas de relajación y ejercicios de respiración profunda para ayudar a disminuir el estrés. Tate médico puede recomendarle que hable con alguien sobre tate estrés o ansiedad, katie un consejero o un amigo. [place] lo suficiente. Realice actividad física con regularidad.  Abran afrodescendiente caminando katie ejercicio           •No fume.La nicotina y otros químicos en los cigarrillos pueden dañarle el esófago y el estómago. Pida información a tate médico si usted actualmente fuma y necesita ayuda para dejar de fumar. Los cigarrillos electrónicos o el tabaco sin humo igualmente contienen nicotina. Consulte con tate médico antes de utilizar estos productos.      Llame al número de emergencias local (911 en los [place]) si:  •Tiene alguno de los siguientes signos de un ataque cardíaco: ?Estrujamiento, presión o tensión en tate pecho      ?Usted también podría presentar alguno de los siguientes: ?Malestar o dolor en tate espalda, tamara, mandíbula, abdomen, o brazo      ?Falta de aliento      ?Náuseas o vómitos      ?Desvanecimiento o sudor frío repentino            ¿Cuándo radha buscar atención inmediata?  •Usted no puede dejar de vomitar o vomita kris.      •Usted tiene kris en las evacuaciones intestinales o estas tienen aspecto alquitranado.      •Usted tiene sangrado por tate recto.      •El tamaño del abdomen es más melany de lo normal y se siente kait y más doloroso.      •Tiene dolor abdominal intenso.      •Usted lisa de tener flatulencias y evacuaciones intestinales.      •Usted se siente mareado, débil o tiene sensación de desmayo.      ¿Cuándo radha llamar a mi médico?  •Tiene fiebre.      •Tiene nuevos signos y síntomas, o estos empeoran.      •Bella síntomas no mejoran con el tratamiento.      •Usted tiene preguntas o inquietudes acerca de tate condición o cuidado.      ACUERDOS SOBRE TATE CUIDADO:    Usted tiene el derecho de ayudar a planear tate cuidado. Aprenda todo lo que pueda sobre tate condición y katie darle tratamiento. Discuta bella opciones de tratamiento con bella médicos para decidir el cuidado que usted desea recibir. Usted siempre tiene el derecho de rechazar el tratamiento.       © Copyright [employer] 2022           back to top                          © Copyright [employer] 2022

## 2022-09-19 NOTE — ED PROVIDER NOTE - OBJECTIVE STATEMENT
42 y F pmh of pre-dm presenting with mid abdominal pain, diarrhea, and nausea for the last 4 days.  Began after eating fish and attributed symptoms to this.  Now has pain any time she eats.  has history of gastritis, but today's pain feels different.  No fevers.  No prior abd surgeries. No known gallbladder issues.  No cp/sob.

## 2022-09-20 VITALS
SYSTOLIC BLOOD PRESSURE: 145 MMHG | DIASTOLIC BLOOD PRESSURE: 85 MMHG | HEART RATE: 80 BPM | OXYGEN SATURATION: 98 % | RESPIRATION RATE: 18 BRPM | TEMPERATURE: 99 F

## 2022-09-20 RX ORDER — KETOROLAC TROMETHAMINE 30 MG/ML
30 SYRINGE (ML) INJECTION ONCE
Refills: 0 | Status: DISCONTINUED | OUTPATIENT
Start: 2022-09-20 | End: 2022-09-20

## 2022-09-20 RX ADMIN — Medication 30 MILLIGRAM(S): at 03:03

## 2022-09-23 NOTE — ED ADULT NURSE NOTE - HOW OFTEN DO YOU HAVE SIX OR MORE DRINKS ON ONE OCCASION?
Comprehensive Annual Visit (CAV) home-visit appointment scheduling outreach outcome:    Unable to make appointment: Patient declined       Pt declined just saw her PCP last week.     Ratna Renteria  Population Health Assistant   PH. [phone]     Never

## 2023-01-01 NOTE — ED STATDOCS - CONDITION AT DISCHARGE:
Lactation Consultant Note  Lactation Consultation  Reason for Consult: Follow-up assessment    Maternal Information  Has mother  before?: Yes  How long did the mother previously breastfeed?: 1 year  Infant to breast within first 2 hours of birth?: Yes  Exclusive Pump and Bottle Feed: No    Maternal Assessment  Breast Assessment: Large, Pendulous, Soft, Compressible  Nipple Assessment: Intact, Erect with stimulation  [place] Assessment: Normal    Infant Assessment  Infant Behavior: Deep sleep, Content after feeding    Feeding Assessment  Nutrition Source: Breastmilk    LATCH TOOL       Breast Pump       Other OB Lactation Tools       Patient Follow-up  Inpatient Lactation Follow-up Needed : Yes    Other OB Lactation Documentation       Recommendations/Summary  I did not observe a feed this visit. Mom fed two hours prior and then 30 minutes prior to my visit. Mom reports baby is latching readily. She states baby latches better to the right side. Mom does describe a shallow latch on the left. Mom has very large, soft, pendulous breasts. I showed mom how to wait for baby to open wide to latch and to adjust the lower jaw while baby is sucking while keeping baby's chin and cheeks pressed against the breast to ensure a deep latch for optimal milk transfer. Mom asked to call out for latch assistance with feeds when needed. Patient asked to attempt/feed baby at least every 2-3 hours or on demand with a goal of 8-12 feeds daily. Feedigng cues reviewed with mom.Breastfeeding education reviewed and questions answered. Mom aware of lactation support and asked to call out for feed assistance and with questions as needed.      1215    Assisted with latch to the left side in football hold. Baby latched readily and deeply sucking with long jaw movement with some swallows noted. Mom continues to work on achieving a deep latch.    Satisfactory

## 2023-02-27 ENCOUNTER — OFFICE (OUTPATIENT)
Dept: URBAN - METROPOLITAN AREA CLINIC 102 | Facility: CLINIC | Age: 43
Setting detail: OPHTHALMOLOGY
End: 2023-02-27
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
## 2023-02-27 PROCEDURE — NO SHOW FE NO SHOW FEE: Performed by: OPHTHALMOLOGY

## 2023-03-02 NOTE — ED ADULT NURSE NOTE - CAS TRG GENERAL AIRWAY, MLM
REFERRED BY:  Eunice Dubois MD   Fax: [phone]       Patient ID: Jacky Laughlin is a 68 year old male.      Chief Complaint   Patient presents with   • Consultation     New patient sunithaal.   • Office Visit     Denies chest pain,dizziness or SOB.         HISTORY OF PRESENT ILLNESS:    I had the pleasure of seeing Mr. Laughlin on cardiology office follow up in my office.  He has history of A. fib on Xarelto, CAD with history of STEMI c/b V. 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 Patient used to see Dr. Kwan but wanted a Polish speaking cardiologist.   He was recently admitted to [facility] after presenting with shortness of breath. On presentation to the ED he was thought to be in SVT to 160s but was unresponsive to adenosine x3.  Telemetry was more consistent with A. fib RVR and was given Lopressor 5 mg IV x2 which improved rates temporarily.  Afterward, patient became acutely hypoxic to the 80s therefore was placed on BiPAP.  Chest x-ray consistent with edema but also showed right-sided opacification.  He was evaluated by cardiology in the ED.  He was started on amiodarone and heparin drips.  TTE showed severely reduced EF of 17% with severe pulmonary hypertension.  Patient was also noted to be COVID-positive therefore started on remdesivir and dexamethasone.  Patient was admitted to the MICU due to AHRF requiring BiPAP.  Rates were better controlled on amiodarone drip on arrival to the MICU.  Patient is being diuresed with Lasix.  However, rates have been intermittently going back up to 140s therefore was given digoxin 250 mcg.  And now on 125mcg PO QD. His Home metoprolol XL 25mg PO QD was decreased to 12.5mg PO QD, Laxis 60mg POQD decreased to 40mg PO QD by cardiology and Jardiance 10mg PO QD was added. Repeat Echo on 1/18/23 showed improvement of EF : 17% up to 45%   The patient states he feels better since  discharge.   Specifically, he denied rest or exertional shortness of breath or chest discomfort.  He further denied orthopnea, paroxysmal nocturnal dyspnea, lower extremity edema, syncope, presyncope, palpitations, or claudication.  He further denied hemoptysis, hematemesis, hematochezia, melena, hematuria, epistaxis, easy bruising or bleeding.  His current rhythm remains afb , but rate is controlled. 80-90s.    Amiodarone was recently held by Dr. Kwan due to tremor.   Takes Aspirin QOD because had nose bleeds  Takes Xarelto with dinner.     Cardiac tests:  TTE 1/80/2023: Dilated LV with reduced systolic function and LVEF of 45%, trivial MR, normal RV size and systolic function  TTE 1/13/2023: Mildly dilated LV cavity with mildly increased wall thickness, severely reduced systolic function with LVEF of 70%, difficult to schedule cases, mild MR, dilated RV with severely reduced systolic function, severely increased pulm artery pressure estimated to be 77 mmHg, pericardial effusion  TTE 6/18/2022: Dilated RV moderately systolic function, increased systolic pressure with RVSP of 62 mmHg, normal LV size with reduced systolic function LVEF 46%, grade 2 diastolic dysfunction, mild MR, dilated left atrium  Right lower extremity vascular duplex study 8/2020: Redemonstration of partially thrombosed evaluation in the right groin appears to be arising from the rectum femoral artery  TTE 8/17/2020: Normal LV size with reduced function, LV 45%, uses a basal mid inferoseptal myocardium, mild AI, mild MR, dilated left atrium, mild to moderate TR, pulm artery systolic pressure estimate to be 50 mmHg  Left heart cath 8/16/2020: Angioplasty and QUANG to 100% occluded second acute marginal branch, 100% occluded posterior lateral branch with significant atrial thrombus but no restoration of significant antegrade flow despite multiple attempts at lobectomy, LVEDP 10 mmHg  TTE 4/2019: Normal LV size with reduced systolic function LVEF 40  to 45% severe Of the apical inferior apical septal and apical myocardium, mild AI, mild to moderately dilated left atrium, mildly dilated RV with low normal systolic function RVSP 40 mmHg, dilated right atrium  TTE 6/4/2018: Dilated LV size with reduced systolic function LVEF 40 to 45%, dilated left atrium, dilated right atrium, mild to moderate TR  TTE 3/7/2017: Normal LV size with LVEF of 30 to 35%, dilated left atrium, dilated RV with moderately reduced systolic function, RVSP 1 6 mmHg, mild TR  TTE 9/13/916: LVEF of 40 to 50%, mild to moderate MR, dilated left atrium, dilated right atrium mild to moderate TR, mild PI    Cardiac meds:  Aspirin 81 mg daily  Atorvastatin 80 mg daily  Digoxin 125 mcg daily  Jardiance 10 mg daily  Lasix 40 mg daily  Metoprolol succ 25 mg daily  Xarelto 20 mg with dinner  Entresto 24-26 mg every evening    Problem   Ischemic Cardiomyopathy       Past Medical History:   Diagnosis Date   • Alcohol consumption heavy    • Atrial fibrillation (CMD)    • History of cardiac arrest 08/16/2020   • Malignant neoplasm (CMD)        Past Surgical History:   Procedure Laterality Date   • Ptca with stent  08/16/2020    stent to L circumflex       ALLERGIES:  No Known Allergies    Current Outpatient Medications   Medication Sig Dispense Refill   • furosemide (LASIX) 40 MG tablet Take 1 tablet by mouth daily. 30 tablet 0   • aspirin 81 MG chewable tablet Chew 1 tablet by mouth daily. Do not start before January 20, 2023. 30 tablet 0   • atorvastatin (LIPITOR) 80 MG tablet Take 1 tablet by mouth nightly. 30 tablet 0   • empagliflozin (JARDIANCE) 10 MG tablet Take 1 tablet by mouth daily. 30 tablet 0   • digoxin (LANOXIN) 0.125 MG tablet Take 1 tablet by mouth daily. 30 tablet 0   • metoPROLOL succinate (TOPROL-XL) 25 MG 24 hr tablet Take 0.5 tablets by mouth daily. 15 tablet 0   • sacubitril-valsartan (Entresto) 24-26 MG per tablet Take 1 tablet by mouth every evening.     • rivaroxaban (XARELTO) 20 MG  Tab Take 1 tablet by mouth daily (with dinner). 30 tablet 0     No current facility-administered medications for this visit.       Social History     Tobacco Use   • Smoking status: Never   • Smokeless tobacco: Never   Substance Use Topics   • Alcohol use: Never       Family History   Family history unknown: Yes       Review of Systems   Constitutional: Negative for fever and malaise/fatigue.   HENT: Negative for congestion.    Eyes: Negative for discharge.   Cardiovascular: Negative for chest pain, dyspnea on exertion, irregular heartbeat, leg swelling, near-syncope, orthopnea, palpitations, paroxysmal nocturnal dyspnea and syncope.   Respiratory: Negative for cough, shortness of breath and sleep disturbances due to breathing.    Hematologic/Lymphatic: Does not bruise/bleed easily.   Skin: Negative for rash.   Musculoskeletal: Negative for falls.   Gastrointestinal: Negative for abdominal pain, hematemesis, hematochezia, nausea and vomiting.   Genitourinary: Negative for hematuria.   Neurological: Negative for dizziness and light-headedness.   Psychiatric/Behavioral: Negative for altered mental status.         PHYSICAL EXAM:  Vitals:    03/02/23 1002 03/02/23 1004   BP: 108/66 108/66   BP Location: LUE - Left upper extremity LUE - Left upper extremity   Patient Position: Sitting Standing   Cuff Size: Regular Regular   Pulse: 82    Temp: 96.8 °F (36 °C)    TempSrc: Temporal    SpO2: 98%    Weight: 96.6 kg (213 lb)    Height: 5' 7\" (1.702 m)      Physical Exam  Constitutional:       General: He is not in acute distress.     Appearance: He is not toxic-appearing.   HENT:      Head: Normocephalic and atraumatic.   Eyes:      General:         Right eye: No discharge.         Left eye: No discharge.   Cardiovascular:      Rate and Rhythm: Normal rate. Rhythm irregular.      Pulses: Normal pulses.      Heart sounds: Normal heart sounds. No murmur heard.    No gallop.   Pulmonary:      Effort: Pulmonary effort is normal.  No respiratory distress.      Breath sounds: Normal breath sounds. No wheezing or rales.   Abdominal:      General: Bowel sounds are normal. There is no distension.      Palpations: Abdomen is soft.      Tenderness: There is no abdominal tenderness. There is no guarding.   Musculoskeletal:      Right lower leg: No edema.      Left lower leg: No edema.   Skin:     General: Skin is warm and dry.      Capillary Refill: Capillary refill takes less than 2 seconds.   Neurological:      Mental Status: He is alert and oriented to person, place, and time. Mental status is at baseline.   Psychiatric:         Mood and Affect: Mood normal.         Behavior: Behavior normal.         Thought Content: Thought content normal.         Judgment: Judgment normal.           RESULTS:  Results for orders placed or performed during the hospital encounter of 01/13/23   Electrocardiogram 12-Lead   Result Value Ref Range    Ventricular Rate EKG/Min (BPM) 149     Atrial Rate (BPM) 138     DC-Interval (MSEC) 56     QRS-Interval (MSEC) 118     QT-Interval (MSEC) 318     QTc 501     R Axis (Degrees) -51     T Axis (Degrees) -168     REPORT TEXT        **Poor data quality, interpretation may be adversely affected**  Atrial fibrillation  with rapid ventricular response  Left axis deviation  Nonspecific intraventricular conduction delay  Nonspecific ST-T wave changes, diffusely  Baseline wander  Confirmed by MERCED BECERRA MD ([id number]) on 1/14/2023 2:08:04 AM               WBC (K/mcL)   Date Value   01/18/2023 13.1 (H)       RBC (mil/mcL)   Date Value   01/18/2023 4.54       HCT (%)   Date Value   01/18/2023 43.4       HGB (g/dL)   Date Value   01/18/2023 15.3       PLT (K/mcL)   Date Value   01/18/2023 102 (L)       TSH (mcunit/mL)   Date Value   09/22/2016 1.221       Cholesterol (mg/dL)   Date Value   03/02/2023 129       HDL (mg/dL)   Date Value   03/02/2023 38 (L)       Cholesterol/ HDL Ratio (no units)   Date Value   03/02/2023 3.4        Triglycerides (mg/dL)   Date Value   03/02/2023 210 (H)       LDL (mg/dL)   Date Value   03/02/2023 49         IMPRESSION AND PLAN:  1. HFrEF (heart failure with reduced ejection fraction) (CMD)    2. Acute on chronic systolic heart failure (CMD)    3. Atrial fibrillation with RVR (CMD)    4. Pseudoaneurysm (CMD)        HFrEF  - currently compensated  - try to take Entresto BID but closely monitor BP while doing so  - continue Jardiance  - Continue Lasix at current dose  - continue metoprolol succ  - if BP remains stable on BID Entresto will add low dose spironolactone    A. Fib:  - currently rate controlled  - continue digoxin  - continue off amio right now - a. Fib with RVR during hospitalization was likely a response to infection and hopefully patient won't need long term amio  - he will closely monitor his HR   - continue Xarelto with dinner    CAD  - currently asymptomatic  - continue Aspirin, high intensity statin  - continue metoprolol    Follow up: 4 weeks    Medication doses, purpose and potential side effects were discussed with the patient.  Medication not prescribed by me may be inaccurate.     Patient verbalized understanding of the plan and agreed to proceed with it.     Portions of this note may have been created using the Dragon voice recognition system.  Errors in content may be related to improper recognition of the system.  Effort to review and correct the note has been made but irregularities may still be present.      Thank you for allowing me to participate in this patient's care.     Vaishali Bone MD  3/5/2023  10:07 PM    NOTE TO PATIENT:  The 21st Century Cures Act makes medical notes like these available to patients in the interest of transparency. However, please note that this is a medical document. It is intended as peer to peer communication. It is written in medical language and may contain abbreviations or verbiage that are unfamiliar. It may appear blunt or direct. Medical  Patent documents are intended to carry relevant information, facts as evident, and the clinical opinion of the practitioner.

## 2023-03-25 ENCOUNTER — NON-APPOINTMENT (OUTPATIENT)
Age: 43
End: 2023-03-25

## 2023-06-28 ENCOUNTER — EMERGENCY (EMERGENCY)
Facility: HOSPITAL | Age: 43
LOS: 0 days | Discharge: HOME | End: 2023-06-28
Attending: EMERGENCY MEDICINE
Payer: MEDICAID

## 2023-06-28 VITALS
OXYGEN SATURATION: 100 % | RESPIRATION RATE: 18 BRPM | HEART RATE: 73 BPM | SYSTOLIC BLOOD PRESSURE: 137 MMHG | TEMPERATURE: 98 F | DIASTOLIC BLOOD PRESSURE: 80 MMHG

## 2023-06-28 VITALS — WEIGHT: 190.04 LBS | HEIGHT: 65 IN

## 2023-06-28 DIAGNOSIS — N93.9 ABNORMAL UTERINE AND VAGINAL BLEEDING, UNSPECIFIED: ICD-10-CM

## 2023-06-28 DIAGNOSIS — Z98.51 TUBAL LIGATION STATUS: ICD-10-CM
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
## 2023-06-28 DIAGNOSIS — T83.32XA DISPLACEMENT OF INTRAUTERINE CONTRACEPTIVE DEVICE, INITIAL ENCOUNTER: ICD-10-CM

## 2023-06-28 DIAGNOSIS — Z41.9 ENCOUNTER FOR PROCEDURE FOR PURPOSES OTHER THAN REMEDYING HEALTH STATE, UNSPECIFIED: Chronic | ICD-10-CM
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
## 2023-06-28 DIAGNOSIS — R10.2 PELVIC AND PERINEAL PAIN: ICD-10-CM

## 2023-06-28 DIAGNOSIS — K44.9 DIAPHRAGMATIC HERNIA WITHOUT OBSTRUCTION OR GANGRENE: ICD-10-CM

## 2023-06-28 DIAGNOSIS — Z98.51 TUBAL LIGATION STATUS: Chronic | ICD-10-CM

## 2023-06-28 DIAGNOSIS — K21.9 GASTRO-ESOPHAGEAL REFLUX DISEASE WITHOUT ESOPHAGITIS: ICD-10-CM

## 2023-06-28 LAB
ALBUMIN SERPL ELPH-MCNC: 3.5 G/DL — SIGNIFICANT CHANGE UP (ref 3.3–5)
ALP SERPL-CCNC: 108 U/L — SIGNIFICANT CHANGE UP (ref 40–120)
ALT FLD-CCNC: 22 U/L — SIGNIFICANT CHANGE UP (ref 12–78)
ANION GAP SERPL CALC-SCNC: 5 MMOL/L — SIGNIFICANT CHANGE UP (ref 5–17)
APPEARANCE UR: CLEAR — SIGNIFICANT CHANGE UP
APTT BLD: 33.9 SEC — SIGNIFICANT CHANGE UP (ref 27.5–35.5)
AST SERPL-CCNC: 24 U/L — SIGNIFICANT CHANGE UP (ref 15–37)
BACTERIA # UR AUTO: ABNORMAL
BASOPHILS # BLD AUTO: 0.06 K/UL — SIGNIFICANT CHANGE UP (ref 0–0.2)
BASOPHILS NFR BLD AUTO: 0.7 % — SIGNIFICANT CHANGE UP (ref 0–2)
BILIRUB SERPL-MCNC: 0.3 MG/DL — SIGNIFICANT CHANGE UP (ref 0.2–1.2)
BILIRUB UR-MCNC: NEGATIVE — SIGNIFICANT CHANGE UP
BLD GP AB SCN SERPL QL: SIGNIFICANT CHANGE UP
BUN SERPL-MCNC: 12 MG/DL — SIGNIFICANT CHANGE UP (ref 7–23)
CALCIUM SERPL-MCNC: 8.9 MG/DL — SIGNIFICANT CHANGE UP (ref 8.5–10.1)
CHLORIDE SERPL-SCNC: 111 MMOL/L — HIGH (ref 96–108)
CO2 SERPL-SCNC: 23 MMOL/L — SIGNIFICANT CHANGE UP (ref 22–31)
COLOR SPEC: YELLOW — SIGNIFICANT CHANGE UP
CREAT SERPL-MCNC: 0.71 MG/DL — SIGNIFICANT CHANGE UP (ref 0.5–1.3)
DIFF PNL FLD: ABNORMAL
EGFR: 109 ML/MIN/1.73M2 — SIGNIFICANT CHANGE UP
EOSINOPHIL # BLD AUTO: 0.23 K/UL — SIGNIFICANT CHANGE UP (ref 0–0.5)
EOSINOPHIL NFR BLD AUTO: 2.7 % — SIGNIFICANT CHANGE UP (ref 0–6)
EPI CELLS # UR: SIGNIFICANT CHANGE UP
GLUCOSE SERPL-MCNC: 94 MG/DL — SIGNIFICANT CHANGE UP (ref 70–99)
GLUCOSE UR QL: NEGATIVE — SIGNIFICANT CHANGE UP
HCG SERPL-ACNC: <1 MIU/ML — SIGNIFICANT CHANGE UP
HCT VFR BLD CALC: 35.6 % — SIGNIFICANT CHANGE UP (ref 34.5–45)
HGB BLD-MCNC: 11.1 G/DL — LOW (ref 11.5–15.5)
IMM GRANULOCYTES NFR BLD AUTO: 0.4 % — SIGNIFICANT CHANGE UP (ref 0–0.9)
INR BLD: 1.11 RATIO — SIGNIFICANT CHANGE UP (ref 0.88–1.16)
KETONES UR-MCNC: ABNORMAL
LEUKOCYTE ESTERASE UR-ACNC: ABNORMAL
LIDOCAIN IGE QN: 94 U/L — SIGNIFICANT CHANGE UP (ref 73–393)
LYMPHOCYTES # BLD AUTO: 2.6 K/UL — SIGNIFICANT CHANGE UP (ref 1–3.3)
LYMPHOCYTES # BLD AUTO: 30.3 % — SIGNIFICANT CHANGE UP (ref 13–44)
MCHC RBC-ENTMCNC: 24.5 PG — LOW (ref 27–34)
MCHC RBC-ENTMCNC: 31.2 GM/DL — LOW (ref 32–36)
MCV RBC AUTO: 78.6 FL — LOW (ref 80–100)
MONOCYTES # BLD AUTO: 0.76 K/UL — SIGNIFICANT CHANGE UP (ref 0–0.9)
MONOCYTES NFR BLD AUTO: 8.9 % — SIGNIFICANT CHANGE UP (ref 2–14)
NEUTROPHILS # BLD AUTO: 4.89 K/UL — SIGNIFICANT CHANGE UP (ref 1.8–7.4)
NEUTROPHILS NFR BLD AUTO: 57 % — SIGNIFICANT CHANGE UP (ref 43–77)
NITRITE UR-MCNC: NEGATIVE — SIGNIFICANT CHANGE UP
PH UR: 7 — SIGNIFICANT CHANGE UP (ref 5–8)
PLATELET # BLD AUTO: 404 K/UL — HIGH (ref 150–400)
POTASSIUM SERPL-MCNC: 4.3 MMOL/L — SIGNIFICANT CHANGE UP (ref 3.5–5.3)
POTASSIUM SERPL-SCNC: 4.3 MMOL/L — SIGNIFICANT CHANGE UP (ref 3.5–5.3)
PROT SERPL-MCNC: 7.8 GM/DL — SIGNIFICANT CHANGE UP (ref 6–8.3)
PROT UR-MCNC: NEGATIVE — SIGNIFICANT CHANGE UP
PROTHROM AB SERPL-ACNC: 12.9 SEC — SIGNIFICANT CHANGE UP (ref 10.5–13.4)
RBC # BLD: 4.53 M/UL — SIGNIFICANT CHANGE UP (ref 3.8–5.2)
RBC # FLD: 15.2 % — HIGH (ref 10.3–14.5)
RBC CASTS # UR COMP ASSIST: >50 /HPF (ref 0–4)
SODIUM SERPL-SCNC: 139 MMOL/L — SIGNIFICANT CHANGE UP (ref 135–145)
SP GR SPEC: 1.01 — SIGNIFICANT CHANGE UP (ref 1.01–1.02)
UROBILINOGEN FLD QL: NEGATIVE — SIGNIFICANT CHANGE UP
WBC # BLD: 8.57 K/UL — SIGNIFICANT CHANGE UP (ref 3.8–10.5)
WBC # FLD AUTO: 8.57 K/UL — SIGNIFICANT CHANGE UP (ref 3.8–10.5)
WBC UR QL: SIGNIFICANT CHANGE UP /HPF (ref 0–5)

## 2023-06-28 PROCEDURE — 99284 EMERGENCY DEPT VISIT MOD MDM: CPT

## 2023-06-28 PROCEDURE — 99285 EMERGENCY DEPT VISIT HI MDM: CPT | Mod: 25

## 2023-06-28 PROCEDURE — 85730 THROMBOPLASTIN TIME PARTIAL: CPT

## 2023-06-28 PROCEDURE — 80053 COMPREHEN METABOLIC PANEL: CPT

## 2023-06-28 PROCEDURE — 36415 COLL VENOUS BLD VENIPUNCTURE: CPT

## 2023-06-28 PROCEDURE — 83690 ASSAY OF LIPASE: CPT

## 2023-06-28 PROCEDURE — 93975 VASCULAR STUDY: CPT

## 2023-06-28 PROCEDURE — 86850 RBC ANTIBODY SCREEN: CPT

## 2023-06-28 PROCEDURE — 85610 PROTHROMBIN TIME: CPT

## 2023-06-28 PROCEDURE — 84702 CHORIONIC GONADOTROPIN TEST: CPT

## 2023-06-28 PROCEDURE — 85025 COMPLETE CBC W/AUTO DIFF WBC: CPT

## 2023-06-28 PROCEDURE — 93975 VASCULAR STUDY: CPT | Mod: 26

## 2023-06-28 PROCEDURE — 86901 BLOOD TYPING SEROLOGIC RH(D): CPT

## 2023-06-28 PROCEDURE — 76830 TRANSVAGINAL US NON-OB: CPT

## 2023-06-28 PROCEDURE — 99283 EMERGENCY DEPT VISIT LOW MDM: CPT

## 2023-06-28 PROCEDURE — 76830 TRANSVAGINAL US NON-OB: CPT | Mod: 26

## 2023-06-28 PROCEDURE — 81001 URINALYSIS AUTO W/SCOPE: CPT

## 2023-06-28 PROCEDURE — 86900 BLOOD TYPING SEROLOGIC ABO: CPT

## 2023-06-28 NOTE — ED STATDOCS - PROGRESS NOTE DETAILS
44 y/o F with PMH of GERD/gastritis, tubal ligation presents with lower abdominal pain x 1 week. Pt has known IUD in place sinec 1/2023. Removal was attempted by her OBGYN 3/2023 without success. Has been experiencing vaginal bleeding since onset of pain as well. Has follow up planned for removal in September. PE: Well appearing. Cardiac: s1s2, RRR. lungs: CTAB. Abdomen: NBS x4, soft, lower abdominal tenderness. A/P: Concern for IUD perforation D/w GYN team. Patient is hemodynamically stable. Removal likely to require operative management. Currently with patient in PACU, will see patient in ED as soon as possible. - Abdoulaye Mo PA-C

## 2023-06-28 NOTE — ED STATDOCS - OBJECTIVE STATEMENT
42 y/o female w/ PMHx of GERD, gastritis, tubal ligation, dermoid tumor left lateral thigh  presents to the ED c/o worsening lower abd pain w/ vaginal bleeding x1 week. States she had an IUD placed in January, states she came back in March due to discomfort and they could not remove it. States she has an appointment in September to have it removed.

## 2023-06-28 NOTE — ED STATDOCS - ATTENDING APP SHARED VISIT CONTRIBUTION OF CARE
I, Ubaldo Ugarte MD, personally saw the patient with ASHLEY.  I have personally performed a face to face diagnostic evaluation on this patient.  I have reviewed the ASHLEY note and agree with the history, exam, and plan of care, except as noted.

## 2023-06-28 NOTE — ED STATDOCS - NSCAREINITIATED _GEN_ER
Pt refused ct- head, pt talk with MD about results of migraine meds, pt was able to ambulate out.
Abdoulaye Mo)

## 2023-06-28 NOTE — ED STATDOCS - CLINICAL SUMMARY MEDICAL DECISION MAKING FREE TEXT BOX
Pt w/ IUD placed about 6 months ago, unable to be removed by GYN in March. Has worsening pain and bleeding x1 week. Plan for labs, urine, pelvis US, may need CT, reassess. Pt w/ IUD placed about 6 months ago, unable to be removed by GYN in March. Has worsening pain and bleeding x1 week. Plan for labs, urine, pelvis US, may need CT, reassess.  Torito - pt seen in ED by GYN team. Unable to visualize string or remove IUD at bedside, will need hysteroscopy for removal. Pt given referrals for more prompt f/u by GYN team. Return precautions discussed

## 2023-06-28 NOTE — CONSULT NOTE ADULT - ASSESSMENT
43y  LMP unknown presents for midline lower abdominal pain with hx of complicated IUD removal with sono finding of malpositioned IUD at level of endocervix.    A/P:  - VSS  - Hg/HCT: 11.1/35.6; WBC: 8.57  - Physical exam: No IUD noted protruding through cervix and no IUD string palpated on bimanual exam.  - Discussed with patient that she will need to proceed with surgery including EUA and possible hysteroscopy for removal of IUD.  - Urged patient to called her provider to reschedule surgery date to sooner. However, if not possible given referral for other GYN providers that may be able to schedule her sooner.   - Patient presently more comfortable. No acute GYN intervention needed at this time as patient clinically and symptomatically stable.   - Patient verbalized understanding and is comfortable with this plan.  - Patient stable for discharge from GYN perspective.    D/w Dr. Telly Reeves MD1  43y  LMP unknown presents for midline lower abdominal pain with hx of complicated IUD removal with sono finding of malpositioned IUD at level of endocervix.    A/P:  - VSS  - Hg/HCT: 11.1/35.6; WBC: 8.57  - Physical exam: No IUD noted protruding through cervix and no IUD string palpated on bimanual exam.  - Discussed with patient that she will need to proceed with surgery including EUA and possible hysteroscopy for removal of IUD.  - Urged patient to called her provider to reschedule surgery date to sooner. However, if not possible given referral for other GYN providers that may be able to schedule her sooner.   - Patient presently more comfortable. No acute GYN intervention needed at this time as patient clinically and symptomatically stable.   - Recommend Ibuprofen prn to help with pain.   - Patient verbalized understanding and is comfortable with this plan.  - Patient stable for discharge from GYN perspective.    D/w Dr. Telly Reeves MD1

## 2023-06-28 NOTE — CONSULT NOTE ADULT - SUBJECTIVE AND OBJECTIVE BOX
HPI:     43y G_P_  Last Menstrual Period   presents with     PMHX;  PSHX;  POBHX;  PGYNHX:  SOCIAL:  Allergies    No Known Allergies    Intolerances      MEDS:      Vital Signs Last 24 Hrs  T(C): 36.2 (2023 12:15), Max: 36.2 (2023 12:15)  T(F): 97.1 (2023 12:15), Max: 97.1 (2023 12:15)  HR: 92 (2023 12:15) (92 - 92)  BP: 131/98 (2023 12:15) (131/98 - 131/98)  BP(mean): 108 (2023 12:15) (108 - 108)  RR: 18 (2023 12:15) (18 - 18)  SpO2: 99% (2023 12:15) (99% - 99%)    Parameters below as of 2023 12:15  Patient On (Oxygen Delivery Method): room air         PHYSICAL EXAM:  CHEST/LUNG: Clear to percussion bilaterally; No rales, rhonchi, wheezing, or rubs  HEART: Regular rate and rhythm; No murmurs, rubs, or gallops  ABDOMEN: Soft, Nontender, Nondistended; Bowel sounds present  EXTREMITIES:  2+ Peripheral Pulses, No clubbing, cyanosis, or edema  PELVIC:        EXTERNAL GENITALIA: Normal. No rashes or lesions noted.         VAGINA: healthy pink mucosa, no gross lesions, no discharge noted, no blood noted.          CERVIX: no lesions. closed, no active bleeding through os. no CMt noted.        UTERUS: normal size, nontender, mobile        ADNEXA: no adnexal masses or tenderness appreciated.    LABS:                        11.1   8.57  )-----------( 404      ( 2023 14:28 )             35.6     06-28    139  |  111<H>  |  12  ----------------------------<  94  4.3   |  23  |  0.71    Ca    8.9      2023 13:56    TPro  7.8  /  Alb  3.5  /  TBili  0.3  /  DBili  x   /  AST  24  /  ALT  22  /  AlkPhos  108      Urinalysis Basic - ( 2023 13:56 )    Color: Yellow / Appearance: Clear / S.010 / pH: x  Gluc: 94 mg/dL / Ketone: Trace  / Bili: Negative / Urobili: Negative   Blood: x / Protein: Negative / Nitrite: Negative   Leuk Esterase: Trace / RBC: >50 /HPF / WBC 0-2 /HPF   Sq Epi: x / Non Sq Epi: x / Bacteria: Occasional          RADIOLOGY STUDIES:     HPI:     43y  LMP unknown presents for midline lower abdominal pain. This pain started acutely last night. Of note, patient states she has been having increased pelvic pain since having IUD placed in January of this year. Her GYN Dr. Yudith CELIS tried removing the IUD in May of this year but was unable to. She scheduled her for surgery to have it removed but the earliest she would be able to would be September. She states this pain is similar however, more intense. She tried taking Tylenol but pain minimally improved. Patient states since having IUD placed she has been having heavier periods. They are irregular and in between filled with days of spotting. She denies fevers, chills, CP, SOB, N/V or dysuria.       PMHX; asthma, GERD  PSHX; BTL  POBHX;  x5  PGYNHX: -fibroids, +hx of cysts; IUD placed 2023 - patient does not know which type but states she had it placed due to history of cysts; no hx of abnormal PAP smears; Follows with GYN Dr. Yudith CELIS  SOCIAL: denies smoking, drug or ETOH use  Allergies: No Known Allergies  MEDS: Pantoprazole, [place]        Vital Signs Last 24 Hrs  T(C): 36.2 (2023 12:15), Max: 36.2 (2023 12:15)  T(F): 97.1 (2023 12:15), Max: 97.1 (2023 12:15)  HR: 92 (2023 12:15) (92 - 92)  BP: 131/98 (2023 12:15) (131/98 - 131/98)  BP(mean): 108 (2023 12:15) (108 - 108)  RR: 18 (2023 12:15) (18 - 18)  SpO2: 99% (2023 12:15) (99% - 99%)    Parameters below as of 2023 12:15  Patient On (Oxygen Delivery Method): room air         PHYSICAL EXAM:  Gen: NAD, A&O x3  Resp: speaking in full sentences with no respiratory distress   ABDOMEN: Soft, Nontender, Nondistended  EXTREMITIES:  2+ Peripheral Pulses, No clubbing, cyanosis, or edema  PELVIC:        EXTERNAL GENITALIA: Normal. No rashes or lesions noted.         VAGINA: healthy pink mucosa, no gross lesions, no discharge noted, minimal maroon blood noted in vaginal vault - similar to menstruation          CERVIX: no lesions. closed, no IUD noted through cervix, no IUD string noted through cervical os         UTERUS: normal size, nontender, mobile        ADNEXA: no adnexal masses or tenderness appreciated.    LABS:                        11.1   8.57  )-----------( 404      ( 2023 14:28 )             35.6         139  |  111<H>  |  12  ----------------------------<  94  4.3   |  23  |  0.71    Ca    8.9      2023 13:56    TPro  7.8  /  Alb  3.5  /  TBili  0.3  /  DBili  x   /  AST  24  /  ALT  22  /  AlkPhos  108      Urinalysis Basic - ( 2023 13:56 )    Color: Yellow / Appearance: Clear / S.010 / pH: x  Gluc: 94 mg/dL / Ketone: Trace  / Bili: Negative / Urobili: Negative   Blood: x / Protein: Negative / Nitrite: Negative   Leuk Esterase: Trace / RBC: >50 /HPF / WBC 0-2 /HPF   Sq Epi: x / Non Sq Epi: x / Bacteria: Occasional          RADIOLOGY STUDIES:  < from: US Transvaginal (23 @ 15:04) >  ACC: [id number] EXAM:  US DPLX PELVIC   ORDERED BY: SHAREE LA     ACC: [id number] EXAM:  US TRANSVAGINAL   ORDERED BY: SHAREE LA     PROCEDURE DATE:  2023          INTERPRETATION:  CLINICAL INFORMATION: IUD unable to be removed by GYN.   Worsening pain.    LMP: 2023    COMPARISON: 2021.    TECHNIQUE:  Endovaginal and transabdominal pelvic sonogram. Color and Spectral   Doppler was performed. Endovaginal sonography was performed for optimal   visualization of the uterus and the ovaries.    FINDINGS:  Uterus: 11.4 cm x 7.1 cm x 8.9 cm. 3.1 cm sized possible intramural   fibroid posteriorly.  Endometrium: 5 mm. Within normal limits. Malpositioned intrauterine   contraceptive device at the level of cervix.    Right ovary: 3.0 cm x 1.3 cm x 1.9 cm. Within normal limits. Normal   arterial and venous waveforms.  Left ovary: 6.2 cm x 4.6 cm x 3.7 cm. 4.5 cm sized simple cyst within the   physiologic range. Normal arterial and venous waveforms.    Fluid: None.    IMPRESSION:  Malpositioned IUD at the level of endocervix.        --- End of Report ---            LOUIS LOJA MD; Attending Radiologist  This document has been electronically signed. 2023  3:34PM    < end of copied text >

## 2023-06-28 NOTE — ED STATDOCS - PHYSICAL EXAMINATION
General: AAOx3, NAD  HEENT: NCAT  Cardiac: Normal rate and rhythym, no murmurs, normal peripheral perfusion  Respiratory: Normal rate and effort. CTAB  GI: +minimal b/l lower abd tenderness  Neuro: No focal deficits. CONTI equally x4, sensation to light touch intact throughout  MSK: FROMx4, no focal bony tenderness, no peripheral edema  Skin: No rash

## 2023-06-28 NOTE — ED STATDOCS - PATIENT PORTAL LINK FT
You can access the FollowMyHealth Patient Portal offered by [facility] by registering at the following website: [email or web address]. By joining [facility]’s FollowMyHealth portal, you will also be able to view your health information using other applications (apps) compatible with our system.

## 2023-06-28 NOTE — ED STATDOCS - NSFOLLOWUPINSTRUCTIONS_ED_ALL_ED_FT
Return to the Emergency Department for worsening or persistent symptoms, and/or ANY NEW OR CONCERNING SYMPTOMS. If you have issues obtaining follow up, please call: [phone] or [phone]  to obtain a doctor or specialist who takes your insurance in your area.    Follow up with Gynecologist as discussed    Intrauterine Device Information    An intrauterine device (IUD) is a medical device that is inserted into the uterus to prevent pregnancy. It is a small, T-shaped device that has one or two nylon strings hanging down from it. The strings hang out of the lower part of the uterus (cervix) to allow for future IUD removal. There are two types of IUDs:  Hormone IUD. This type of IUD is made of plastic and contains the hormone progestin (synthetic progesterone). A hormone IUD may last 3–5 years.  Copper IUD. This type of IUD has copper wire wrapped around it. A copper IUD may last up to 10 years.  How is an IUD inserted?  An IUD is inserted through the vagina, through the cervix, and into the uterus with a minor medical procedure. The procedure for IUD insertion may vary among health care providers and hospitals.    How does an IUD work?  Synthetic progesterone in a hormonal IUD prevents pregnancy by:  Thickening cervical mucus to prevent sperm from entering the uterus.  Thinning the uterine lining to prevent a fertilized egg from being implanted there.  Copper in a copper IUD prevents pregnancy by making the uterus and fallopian tubes produce a fluid that kills sperm.    What are the advantages of an IUD?  Advantages of either type of IUD    An IUD:  Is highly effective in preventing pregnancy.  Is reversible. You can become pregnant shortly after the IUD is removed.  Is low-maintenance and can stay in place for a long time.  Has no estrogen-related side effects.  Can be used when breastfeeding.  Is not associated with weight gain.  Can be inserted right after childbirth, an , or a miscarriage.  Advantages of a hormone IUD    If it is inserted within 7 days of your period starting, it works right after it has been inserted. If the hormone IUD is inserted at any other time in your cycle, you will need to use a backup method of birth control for 7 days after insertion.  It can make menstrual periods lighter or stop completely.  It can reduce menstrual cramping and other discomforts from menstrual periods.  It can be used for 3–5 years, depending on which IUD you have.  Advantages of a copper IUD    It works right after it is inserted.  It can be used as a form of emergency birth control if it is inserted within 5 days after having unprotected sex.  It does not interfere with your body's natural hormones.  It can be used for up to 10 years.  What are the disadvantages of an IUD?  An IUD may cause irregular menstrual bleeding for a period of time after insertion.  It is common to have pain during insertion and have cramping and vaginal bleeding after insertion.  An IUD may cut the uterus (uterine perforation) when it is inserted. This is rare.  Pelvic inflammatory disease (PID) may happen after insertion of an IUD. PID is an infection in the uterus and fallopian tubes. The IUD does not cause the infection. The infection is usually from an unknown sexually transmitted infection (STI). This is rare, and it usually happens during the first 20 days after the IUD is inserted.  A copper IUD can make your menstrual flow heavier and more painful.  IUDs cannot prevent sexually transmitted infections (STIs).  How is an IUD removed?  You will lie on your back with your knees bent and your feet in footrests (stirrups).  A device will be inserted into your vagina to spread apart the vaginal walls (speculum). This will allow your health care provider to see the strings attached to the IUD.  Your health care provider will use a small instrument (forceps) to grasp the IUD strings and will pull firmly until the IUD is removed.  You may have some discomfort when the IUD is removed. Your health care provider may recommend taking over-the-counter pain relievers, such as ibuprofen, before the procedure. You may also have minor spotting for a few days after the procedure.    The procedure for IUD removal may vary among health care providers and hospitals.    Is an IUD right for me?  If you are interested in an IUD, discuss it with your health care provider. He or she will make sure you are a good candidate for an IUD and will let you know more about the advantages, disadvantage, and possible side effects. This will allow you to make a decision about the device.    Summary  An intrauterine device (IUD) is a medical device that is inserted in the uterus to prevent pregnancy. It is a small, T-shaped device that has one or two nylon strings hanging down from it.  A hormone IUD contains the hormone progestin (synthetic progesterone). A copper IUD has copper wire wrapped around it.  Synthetic progesterone in a hormone IUD prevents pregnancy by thickening cervical mucus and thinning the walls of the uterus. Copper in a copper IUD prevents pregnancy by making the uterus and fallopian tubes produce a fluid that kills sperm.  A hormone IUD can be left in place for 3–5 years. A copper IUD can be left in place for up to 10 years.  An IUD is inserted and removed by a health care provider. You may feel some pain during insertion and removal. Your health care provider may recommend taking over-the-counter pain medicine, such as ibuprofen, before an IUD procedure.  This information is not intended to replace advice given to you by your health care provider. Make sure you discuss any questions you have with your health care provider.

## 2023-06-28 NOTE — ED ADULT NURSE NOTE - NSFALLUNIVINTERV_ED_ALL_ED
Bed/Stretcher in lowest position, wheels locked, appropriate side rails in place/Call bell, personal items and telephone in reach/Instruct patient to call for assistance before getting out of bed/chair/stretcher/Non-slip footwear applied when patient is off stretcher/Christopher to call system/Physically safe environment - no spills, clutter or unnecessary equipment/Purposeful proactive rounding/Room/bathroom lighting operational, light cord in reach

## 2023-06-28 NOTE — ED STATDOCS - NS_ ATTENDINGSCRIBEDETAILS _ED_A_ED_FT
I, Ubaldo Ugarte MD,  performed the initial face to face bedside interview with this patient regarding history of present illness, review of symptoms and relevant past medical, social and family history.  I completed an independent physical examination.  I was the initial provider who evaluated this patient. I have signed out the follow up of any pending tests (i.e. labs, radiological studies) to the ASHLEY.  I have communicated the patient’s plan of care and disposition with the ASHLEY.  The history, relevant review of systems, past medical and surgical history, medical decision making, and physical examination was documented by the scribe in my presence and I attest to the accuracy of the documentation.

## 2023-06-28 NOTE — ED ADULT NURSE NOTE - CHIEF COMPLAINT
Here for consultation with Dr. Sushant Moon.  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    Reason for Visit: To discuss radiation treatment to CNS metastasis with vasogenic edema.      Cancer Stages: IV    Radiation Therapy History:   None    Referred by Dr. Benita Desai to discuss radiation treatment options.    REVIEW OF SYSTEMS: GENERAL:  Patient denies fevers, chills, night sweats, excessive fatigue, anorexia, weight loss  ALLERGIC/IMMUNOLOGIC: no reactions  EYES:  Patient denies significant visual difficulties, diplopia. States having light foggy like vision at times.  ENT/MOUTH:  Patient denies problems with hearing, sore throat, mucositis or mouth sores, sinus drainage, States having periods of left ear aches.  ENDOCRINE:  Patient denies diabetes, thyroid disease, hormone replacement, hot flashes or night sweats  HEMATOLOGIC/LYMPHATIC: Patient denies tender or palpable lymph nodes, but complains of: easy bruising. Patient had Port placed on 01/26/2023, systemic treatment recommended with chemotherapy treatment education scheduled for 02/03/2023, followed by Dr. Desai.    BREASTS: Patient denies abnormal masses of breast, nipple discharge, pain  RESPIRATORY:  Patient denies  chest pain, hemoptysis, but complains of: dyspnea (with increased activity) and cough.  She is actively undergoing smoking cessation, states takes 1-2 puffs from a cigarette if that a few times a week, but is determined to quit smoking all together. States taking a deep breath can be uncomfortable at times.   CARDIOVASCULAR:  Patient denies anginal chest pain, palpitations, orthopnea, peripheral edema   GASTROINTESTINAL: Patient denies vomiting, diarrhea, GI bleeding, constipation, change in bowel habits, heartburn, early satiety, but complains of: nausea, has resolved with Decadron.    (FEMALE):   Patient denies  abnormal genital masses, hematuria, hesitancy, incontinence, vaginal bleeding, discharge, other problems with urination, problems with sexual activity, but complains of:  Bladder urgency.  MUSCULOSKELETAL:  Patient denies  joint pain, swelling or redness, decreased range of motion  SKIN:  Patient denies chronic rashes, inflammation, ulcerations or skin changes  NEUROLOGIC:  Patient denies blurred vision, areas of focal weakness or numbness, abnormal gait, sensory problems. Brain MRI dated 01/25/2023 indicated the following impression: 13mm metastatic lesion in the right cerebral hemisphere with moderate surrounding vasogenic edema.   Headaches to the forehead area, states since starting Decadron (current dose: 4mg BID for 1 week today) have  resolved. States she was on DecadronTID cause a significant interruption to her sleep, which has improved since tapering to BID.   PSYCHIATRIC: Patient denies anxiety, insomnia, depression, rere or mood swings, psychotropic drugs  post-menopausal       The patient is a 43y Female complaining of abdominal pain.

## 2023-06-30 ENCOUNTER — APPOINTMENT (OUTPATIENT)
Dept: OBGYN | Facility: CLINIC | Age: 43
End: 2023-06-30
Payer: MEDICAID

## 2023-06-30 VITALS
DIASTOLIC BLOOD PRESSURE: 74 MMHG | HEIGHT: 66 IN | SYSTOLIC BLOOD PRESSURE: 118 MMHG | WEIGHT: 210 LBS | BODY MASS INDEX: 33.75 KG/M2

## 2023-06-30 PROCEDURE — 76830 TRANSVAGINAL US NON-OB: CPT

## 2023-06-30 PROCEDURE — 99214 OFFICE O/P EST MOD 30 MIN: CPT

## 2023-07-11 ENCOUNTER — NON-APPOINTMENT (OUTPATIENT)
Age: 43
End: 2023-07-11

## 2023-07-12 ENCOUNTER — LABORATORY RESULT (OUTPATIENT)
Age: 43
End: 2023-07-12

## 2023-07-12 ENCOUNTER — APPOINTMENT (OUTPATIENT)
Dept: OBGYN | Facility: CLINIC | Age: 43
End: 2023-07-12
Payer: MEDICAID

## 2023-07-12 VITALS
HEIGHT: 66 IN | DIASTOLIC BLOOD PRESSURE: 78 MMHG | SYSTOLIC BLOOD PRESSURE: 116 MMHG | WEIGHT: 210 LBS | BODY MASS INDEX: 33.75 KG/M2

## 2023-07-12 DIAGNOSIS — T83.39XA OTHER MECHANICAL COMPLICATION OF INTRAUTERINE CONTRACEPTIVE DEVICE, INITIAL ENCOUNTER: ICD-10-CM

## 2023-07-12 DIAGNOSIS — N84.0 POLYP OF CORPUS UTERI: ICD-10-CM

## 2023-07-12 PROCEDURE — 58558Z: CUSTOM

## 2023-07-12 NOTE — PROCEDURE
[Hysteroscopy] : Hysteroscopy [Time out performed] : Pre-procedure time out performed.  Patient's name, date of birth and procedure confirmed. [Consent Obtained] : Consent obtained [Impacted foreign body (retained IUD)] : Evaluation of mullerian defect (septum) [Risks] : risks [Benefits] : benefits [Alternatives] : alternatives [Patient] : patient [Infection] : infection [Bleeding] : bleeding [Allergic Reaction] : allergic reaction [rigid] : Using aseptic technique a hysteroscopy was performed using a rigid hysteroscope [Sent to Pathology] : specimen was placed in buffered formalin and sent for pathology [Antibiotics given] : antibiotics not given [Hemostasis obtained] : hemostasis obtained [Tolerated Well] : Patient tolerated the procedure well [Aftercare instructions/regstrictions given and follow-up scheduled] : Aftercare instructions/restrictions given and follow-up scheduled [de-identified] : IUD removed intact and without complications; a small endometrial polyp seen and removed using hysteroscopic graspers. Hemostasis secured. [de-identified] : endometrial polyp [de-identified] : N/A

## 2023-07-12 NOTE — ASSESSMENT
[FreeTextEntry1] : Nothing per vagina x 1wk\par \par Not interested in any form of contraception at this time

## 2023-07-26 ENCOUNTER — NON-APPOINTMENT (OUTPATIENT)
Age: 43
End: 2023-07-26

## 2023-07-27 ENCOUNTER — RX ONLY (RX ONLY)
Age: 43
End: 2023-07-27

## 2023-07-27 ENCOUNTER — OFFICE (OUTPATIENT)
Dept: URBAN - METROPOLITAN AREA CLINIC 102 | Facility: CLINIC | Age: 43
Setting detail: OPHTHALMOLOGY
End: 2023-07-27
Payer: COMMERCIAL

## 2023-07-27 DIAGNOSIS — H43.393: ICD-10-CM

## 2023-07-27 DIAGNOSIS — D31.40: ICD-10-CM

## 2023-07-27 PROCEDURE — 92014 COMPRE OPH EXAM EST PT 1/>: CPT | Performed by: OPHTHALMOLOGY

## 2023-07-27 ASSESSMENT — REFRACTION_AUTOREFRACTION
OD_AXIS: 017
OD_CYLINDER: -2.00
OS_SPHERE: 0.00
OD_SPHERE: +0.25
OS_CYLINDER: -0.75
OS_AXIS: 177

## 2023-07-27 ASSESSMENT — REFRACTION_CURRENTRX
OD_AXIS: 10
OS_SPHERE: PLANO
OS_OVR_VA: 20/
OD_SPHERE: +0.25
OD_OVR_VA: 20/
OS_VPRISM_DIRECTION: SV
OS_AXIS: 180
OD_CYLINDER: -1.50
OD_VPRISM_DIRECTION: SV
OS_CYLINDER: -0.75

## 2023-07-27 ASSESSMENT — AXIALLENGTH_DERIVED
OD_AL: 22.6387
OD_AL: 22.6387
OS_AL: 22.6707
OS_AL: 22.7158
OS_AL: 22.7158
OD_AL: 22.5049

## 2023-07-27 ASSESSMENT — KERATOMETRY
OD_K2POWER_DIOPTERS: 47.50
METHOD_AUTO_MANUAL: AUTO
OS_AXISANGLE_DEGREES: 076
OD_K1POWER_DIOPTERS: 46.50
OS_K1POWER_DIOPTERS: 45.75
OD_AXISANGLE_DEGREES: 105
OS_K2POWER_DIOPTERS: 47.00

## 2023-07-27 ASSESSMENT — TONOMETRY
OD_IOP_MMHG: 16
OS_IOP_MMHG: 15

## 2023-07-27 ASSESSMENT — REFRACTION_MANIFEST
OS_CYLINDER: -0.75
OD_SPHERE: +0.25
OS_AXIS: 177
OD_CYLINDER: -1.75
OD_CYLINDER: -2.00
OS_CYLINDER: -1.00
OS_VA1: 20/20
OS_SPHERE: +0.25
OD_AXIS: 17
OS_AXIS: 5
OD_SPHERE: +0.50
OD_AXIS: 15
OD_VA1: 20/25-1
OS_SPHERE: 0.00

## 2023-07-27 ASSESSMENT — SPHEQUIV_DERIVED
OS_SPHEQUIV: -0.375
OS_SPHEQUIV: -0.375
OD_SPHEQUIV: -0.375
OS_SPHEQUIV: -0.25
OD_SPHEQUIV: -0.75
OD_SPHEQUIV: -0.75

## 2023-07-27 ASSESSMENT — CONFRONTATIONAL VISUAL FIELD TEST (CVF)
OS_FINDINGS: FULL
OD_FINDINGS: FULL

## 2023-07-27 ASSESSMENT — DECREASING TEAR LAKE - SEVERITY SCORE
OS_DEC_TEARLAKE: T
OD_DEC_TEARLAKE: T

## 2023-07-27 ASSESSMENT — VISUAL ACUITY
OD_BCVA: 20/40
OS_BCVA: 20/70

## 2024-01-01 NOTE — H&P PST ADULT - NS NEC GEN PE MLT EXAM PC
-If your baby has: Difficulty breathing; blue lips or tongue, and/or does not respond to touch.
detailed exam

## 2024-01-18 ENCOUNTER — APPOINTMENT (OUTPATIENT)
Dept: OTOLARYNGOLOGY | Facility: CLINIC | Age: 44
End: 2024-01-18
Payer: MEDICAID

## 2024-01-18 VITALS — TEMPERATURE: 97.1 F | BODY MASS INDEX: 33.75 KG/M2 | WEIGHT: 210 LBS | HEIGHT: 66 IN

## 2024-01-18 DIAGNOSIS — R07.9 CHEST PAIN, UNSPECIFIED: ICD-10-CM

## 2024-01-18 DIAGNOSIS — M26.609 UNSPECIFIED TEMPOROMANDIBULAR JOINT DISORDER: ICD-10-CM

## 2024-01-18 PROCEDURE — 99204 OFFICE O/P NEW MOD 45 MIN: CPT | Mod: 25

## 2024-01-18 PROCEDURE — 31231 NASAL ENDOSCOPY DX: CPT

## 2024-01-18 NOTE — PHYSICAL EXAM
[de-identified] : click over left tmj [Nasal Endoscopy Performed] : nasal endoscopy was performed, see procedure section for findings [] : septum deviated to the left [Midline] : trachea located in midline position [Normal] : no rashes
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
## 2024-01-18 NOTE — REVIEW OF SYSTEMS
[Dizziness] : dizziness [Ear Noises] : ear noises [Sinus Pressure] : sinus pressure [Negative] : Nasal [de-identified] : right side temperal pressure

## 2024-01-18 NOTE — CONSULT LETTER
[Consult Letter:] : I had the pleasure of evaluating your patient, [unfilled]. [Please see my note below.] : Please see my note below. [Consult Closing:] : Thank you very much for allowing me to participate in the care of this patient.  If you have any questions, please do not hesitate to contact me. [Sincerely,] : Sincerely, [FreeTextEntry1] : Dear Dr. ADRIA HARRISON,  Thank you for your kind referral. Please refer to my enclosed office notes for LENNIE ALANIZ . If there are any questions free to contact me. [FreeTextEntry3] : Andrew Brennan MD, FACS

## 2024-01-18 NOTE — ASSESSMENT
[FreeTextEntry1] : deviated septum w left nasal obstruction hx rt max retention cyst tmj-rec dental consult repeat ct scan

## 2024-01-18 NOTE — REASON FOR VISIT
[Initial Consultation] : an initial consultation for [[employer] ] : provided by [employer]   [Time Spent: ____ minutes] : Total time spent using  services: [unfilled] minutes. The patient's primary language is not English thus required  services. [FreeTextEntry2] : sinus and ear issues [Interpreters_IDNumber] : [id number] [Interpreters_FullName] : Jeronimo [TWNoteComboBox1] : Guyanese

## 2024-01-27 ENCOUNTER — APPOINTMENT (OUTPATIENT)
Dept: CT IMAGING | Facility: CLINIC | Age: 44
End: 2024-01-27
Payer: MEDICAID

## 2024-01-27 ENCOUNTER — OUTPATIENT (OUTPATIENT)
Dept: OUTPATIENT SERVICES | Facility: HOSPITAL | Age: 44
LOS: 1 days | End: 2024-01-27
Payer: COMMERCIAL

## 2024-01-27 DIAGNOSIS — Z41.9 ENCOUNTER FOR PROCEDURE FOR PURPOSES OTHER THAN REMEDYING HEALTH STATE, UNSPECIFIED: Chronic | ICD-10-CM

## 2024-01-27 DIAGNOSIS — J34.1 CYST AND MUCOCELE OF NOSE AND NASAL SINUS: ICD-10-CM

## 2024-01-27 DIAGNOSIS — Z98.51 TUBAL LIGATION STATUS: Chronic | ICD-10-CM

## 2024-01-27 PROCEDURE — 70486 CT MAXILLOFACIAL W/O DYE: CPT

## 2024-01-27 PROCEDURE — 70486 CT MAXILLOFACIAL W/O DYE: CPT | Mod: 26

## 2024-01-30 ENCOUNTER — NON-APPOINTMENT (OUTPATIENT)
Age: 44
End: 2024-01-30

## 2024-03-05 ENCOUNTER — APPOINTMENT (OUTPATIENT)
Dept: ORTHOPEDIC SURGERY | Facility: CLINIC | Age: 44
End: 2024-03-05

## 2024-03-08 ENCOUNTER — APPOINTMENT (OUTPATIENT)
Dept: OTOLARYNGOLOGY | Facility: CLINIC | Age: 44
End: 2024-03-08
Payer: COMMERCIAL

## 2024-03-08 VITALS — WEIGHT: 210 LBS | HEIGHT: 66 IN | BODY MASS INDEX: 33.75 KG/M2

## 2024-03-08 PROCEDURE — 99214 OFFICE O/P EST MOD 30 MIN: CPT | Mod: 25

## 2024-03-08 PROCEDURE — 31231 NASAL ENDOSCOPY DX: CPT

## 2024-03-08 RX ORDER — FLUTICASONE PROPIONATE 50 UG/1
50 SPRAY, METERED NASAL
Refills: 0 | Status: ACTIVE | COMMUNITY

## 2024-03-08 RX ORDER — METHYLPREDNISOLONE 4 MG/1
4 TABLET ORAL
Qty: 1 | Refills: 0 | Status: ACTIVE | COMMUNITY
Start: 2024-03-08 | End: 1900-01-01

## 2024-03-08 RX ORDER — AMOXICILLIN AND CLAVULANATE POTASSIUM 875; 125 MG/1; MG/1
875-125 TABLET, COATED ORAL TWICE DAILY
Qty: 28 | Refills: 0 | Status: ACTIVE | COMMUNITY
Start: 2024-03-08 | End: 1900-01-01

## 2024-03-08 RX ORDER — LORATADINE 5 MG/5 ML
0.05 SOLUTION, ORAL ORAL
Qty: 1 | Refills: 0 | Status: ACTIVE | COMMUNITY
Start: 2024-03-08 | End: 1900-01-01

## 2024-03-08 NOTE — ASSESSMENT
[FreeTextEntry1] : 43 year old female presents with nasal congestoin and sinus pressure. On exam, sharp septal spur into L OMC.  CT scan from January 2024 indicates mucosal thickening R>L maxillary sinus, as well as frontal outflow tracts and trace mucosal thickening at ethmoids and sphenoid sinuses. L sphenoid with frothy secretions.  - Will start Flonase. A topical steroid reduce mucosal swelling, illustrated appropriate use and how to reduce the risk of bleeding  - Nasal irrigation and showed how to use it to maximize effectiveness   Discussed options:  1) course of antibiotics and steroids to see if this helps, nasal sprays 2) office procedure to address sinuses and septum We will proceed with a regiment of decongestants, antibiotics and irrigation to try to break the cycle of inflation and obstruction. this will treat the acute symptoms and will hopefully allow for maintenance therapy to reach disease areas and avoid further intervention. - follow up in 1 month. if persists would be a good candidate for office procedure

## 2024-03-08 NOTE — PROCEDURE
[FreeTextEntry6] : Procedure performed: Nasal Endoscopy- Diagnostic Pre-op indication(s): nasal congestion Post-op indication(s): nasal congestion  Verbal and/or written consent obtained from patient Anterior rhinoscopy insufficient to account for symptoms Scope #: 3,  flexible fiber optic telescope  The scope was introduced in the nasal passage between the middle and inferior turbinates to exam the inferior portion of the middle meatus and the fontanelle, as well as the maxillary ostia.  Next, the scope was passed medically and posteriorly to the middle turbinates to examine the sphenoethmoid recess and the superior turbinate region. Upon visualization the finders are as follows: Nasal Septum:sharp septal spur into L OMC  Bilateral - Mucosa: boggy turbinates, Mucous: scant, Polyp: not seen, Inferior Turbinate: boggy, Middle Turbinate: normal, Superior Turbinate: normal, Inferior Meatus: narrow, Middle Meatus: narrow, Super Meatus:normal, Sphenoethmoidal Recess: clear

## 2024-03-08 NOTE — REASON FOR VISIT
[Subsequent Evaluation] : a subsequent evaluation for [FreeTextEntry2] : deviated septum ref by Dr Brennan

## 2024-03-08 NOTE — END OF VISIT
[FreeTextEntry3] : I personally saw and examined  the patient in detail.  I spoke to VAN Rice regarding the assessment and plan of care. I performed the procedures and relevant physical exam.  I have reviewed the above assessment and plan of care and I agree.  I have made changes to the body of the note wherever necessary and appropriate

## 2024-03-08 NOTE — CONSULT LETTER
[FreeTextEntry1] : Dear Dr. TJ PAPPAS I had the pleasure of evaluating your patient LENNIE ALANIZ, thank you for allowing us to participate in their care. please see full note detailing our visit below. If you have any questions, please do not hesitate to call me and I would be happy to discuss further.   Femi Herzog M.D. Attending Physician,   Department of Otolaryngology - Head and Neck Surgery [facility]  Office: [phone] Fax: [phone]

## 2024-03-08 NOTE — HISTORY OF PRESENT ILLNESS
[de-identified] : 43 year old female presents with nasal congestion and sinus pressure. States gets sinus pressure under her eyes and at her forehead which comes and goes. Went to ER for sinus infection in the past. In last 12 months has had one sinus infection.

## 2024-03-21 ENCOUNTER — APPOINTMENT (OUTPATIENT)
Dept: ORTHOPEDIC SURGERY | Facility: CLINIC | Age: 44
End: 2024-03-21
Payer: COMMERCIAL

## 2024-03-21 ENCOUNTER — NON-APPOINTMENT (OUTPATIENT)
Age: 44
End: 2024-03-21

## 2024-03-21 DIAGNOSIS — S46.811A STRAIN OF OTHER MUSCLES, FASCIA AND TENDONS AT SHOULDER AND UPPER ARM LEVEL, RIGHT ARM, INITIAL ENCOUNTER: ICD-10-CM

## 2024-03-21 DIAGNOSIS — G25.89 OTHER SPECIFIED EXTRAPYRAMIDAL AND MOVEMENT DISORDERS: ICD-10-CM

## 2024-03-21 PROCEDURE — 73030 X-RAY EXAM OF SHOULDER: CPT | Mod: RT

## 2024-03-21 PROCEDURE — 99204 OFFICE O/P NEW MOD 45 MIN: CPT | Mod: 25

## 2024-03-22 NOTE — PHYSICAL EXAM
[de-identified] : Physical Exam:  General: Well appearing, no acute distress  Neurologic: A&Ox3, No focal deficits  Head: NCAT without abrasions, lacerations, or ecchymosis to head, face, or scalp  Eyes: No scleral icterus, no gross abnormalities  Respiratory: Equal chest wall expansion bilaterally, no accessory muscle use  Lymphatic: No lymphadenopathy palpated  Skin: Warm and dry  Psychiatric: Normal mood and affect  Right shoulder is examined reveals no swelling or deformity.  There is no erythema or warmth.  There is palpable tenderness and myositis throughout the trapezius and the medial border the scapula.  The patient has had active forward flexion to 170 degrees external rotation at 90 degrees and internal rotation to a mid lumbar level with mild discomfort.  She has a mildly positive Maynard and Neer's.  She has 4.5 out of 5 strength rotator cuff testing.  This causes mild pain.  Her compartments are soft and nontender.  She is grossly neurovascular intact distally. [de-identified] : 4 views of R shoulder were performed today and available for me to review. Results were discussed with the patient. They demonstrate no f/x, dislocation or other deformity.  MAGNETIC RESONANCE IMAGING OF THE RIGHT SHOULDER - STAND UP EXAM DATE: 11/12/2020  IMPRESSION:  Tendinosis of anterior and central fibers of supraspinatus. Reactive subacromial-subdeltoid bursitis.

## 2024-03-22 NOTE — DISCUSSION/SUMMARY
[de-identified] : I had a lengthy discussion with the patient regarding her condition as well as her daughter.  I feel the majority of her pain is myofascial in nature around her scapula and her trapezius.  She has mild pain in the shoulder around the rotator cuff.  She has a contraindication to cortisone therefore I would avoid injecting her right shoulder in the subacromial space.  I feel this would only offer her partial relief anyway.  She be better served following with one of our physiatrist and she was referred to Dr. Delgado for consultation and potential trigger point injections.  She will follow-up with us as needed.  All questions were answered.

## 2024-03-22 NOTE — PHYSICAL EXAM
[de-identified] : Physical Exam:  General: Well appearing, no acute distress  Neurologic: A&Ox3, No focal deficits  Head: NCAT without abrasions, lacerations, or ecchymosis to head, face, or scalp  Eyes: No scleral icterus, no gross abnormalities  Respiratory: Equal chest wall expansion bilaterally, no accessory muscle use  Lymphatic: No lymphadenopathy palpated  Skin: Warm and dry  Psychiatric: Normal mood and affect  Right shoulder is examined reveals no swelling or deformity.  There is no erythema or warmth.  There is palpable tenderness and myositis throughout the trapezius and the medial border the scapula.  The patient has had active forward flexion to 170 degrees external rotation at 90 degrees and internal rotation to a mid lumbar level with mild discomfort.  She has a mildly positive Maynard and Neer's.  She has 4.5 out of 5 strength rotator cuff testing.  This causes mild pain.  Her compartments are soft and nontender.  She is grossly neurovascular intact distally. [de-identified] : 4 views of R shoulder were performed today and available for me to review. Results were discussed with the patient. They demonstrate no f/x, dislocation or other deformity.  MAGNETIC RESONANCE IMAGING OF THE RIGHT SHOULDER - STAND UP EXAM DATE: 11/12/2020  IMPRESSION:  Tendinosis of anterior and central fibers of supraspinatus. Reactive subacromial-subdeltoid bursitis.

## 2024-03-22 NOTE — DISCUSSION/SUMMARY
[de-identified] : I had a lengthy discussion with the patient regarding her condition as well as her daughter.  I feel the majority of her pain is myofascial in nature around her scapula and her trapezius.  She has mild pain in the shoulder around the rotator cuff.  She has a contraindication to cortisone therefore I would avoid injecting her right shoulder in the subacromial space.  I feel this would only offer her partial relief anyway.  She be better served following with one of our physiatrist and she was referred to Dr. Delgado for consultation and potential trigger point injections.  She will follow-up with us as needed.  All questions were answered.

## 2024-03-22 NOTE — REASON FOR VISIT
[Initial Visit] : an initial visit for [Family Member] : family member [FreeTextEntry2] : R shoulder pain.
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
## 2024-04-12 ENCOUNTER — APPOINTMENT (OUTPATIENT)
Dept: OTOLARYNGOLOGY | Facility: CLINIC | Age: 44
End: 2024-04-12

## 2024-05-23 ENCOUNTER — NON-APPOINTMENT (OUTPATIENT)
Age: 44
End: 2024-05-23

## 2024-05-24 ENCOUNTER — APPOINTMENT (OUTPATIENT)
Dept: OTOLARYNGOLOGY | Facility: CLINIC | Age: 44
End: 2024-05-24
Payer: COMMERCIAL

## 2024-05-24 VITALS
DIASTOLIC BLOOD PRESSURE: 84 MMHG | WEIGHT: 210 LBS | HEIGHT: 66 IN | BODY MASS INDEX: 33.75 KG/M2 | HEART RATE: 90 BPM | SYSTOLIC BLOOD PRESSURE: 134 MMHG

## 2024-05-24 DIAGNOSIS — J32.2 CHRONIC ETHMOIDAL SINUSITIS: ICD-10-CM

## 2024-05-24 DIAGNOSIS — J30.9 ALLERGIC RHINITIS, UNSPECIFIED: ICD-10-CM

## 2024-05-24 DIAGNOSIS — J34.2 DEVIATED NASAL SEPTUM: ICD-10-CM

## 2024-05-24 DIAGNOSIS — J34.1 CYST AND MUCOCELE OF NOSE AND NASAL SINUS: ICD-10-CM

## 2024-05-24 DIAGNOSIS — G50.1 ATYPICAL FACIAL PAIN: ICD-10-CM

## 2024-05-24 DIAGNOSIS — J34.3 HYPERTROPHY OF NASAL TURBINATES: ICD-10-CM

## 2024-05-24 PROCEDURE — 31231 NASAL ENDOSCOPY DX: CPT

## 2024-05-24 PROCEDURE — 99214 OFFICE O/P EST MOD 30 MIN: CPT | Mod: 25

## 2024-05-24 NOTE — ASSESSMENT
[FreeTextEntry1] : 43 year old female presents with nasal congestoin and sinus pressure. On exam, sharp septal spur into L OMC.  CT scan from January 2024 indicates mucosal thickening R>L maxillary sinus, as well as frontal outflow tracts and trace mucosal thickening at ethmoids and sphenoid sinuses. L sphenoid with frothy secretions.  - continue Flonase. A topical steroid reduce mucosal swelling, illustrated appropriate use and how to reduce the risk of bleeding  - Nasal irrigation and showed how to use it to maximize effectiveness   Discussed options:  1) course of antibiotics and steroids to see if this helps, nasal sprays 2) office procedure to address sinuses and septum - b/l maxillary, left sphenoid, trim left septum spur, can consider conservative anterior ethmoid as well with some thickening and location of pressure  3) sleeping procedure   - Risks benefits and alternatives of endoscopic sinus surgery with possible image guidance possible septoplasty bilateral inferior turbinate reduction discussed with patient at length. Risks discussed include but were not limited to bleeding, infection, persistent symptoms, scarring, injury to the skull base and brain and CSF leak, injury to orbit and eye, crusting, septal hematoma, septal perforation results in whistling, empty nose syndrome, crusting and bleeding as well as continued nasal obstruction etc.  were discussed. For polyps, discussed very high recurrence rate that require future surveillance, maintenance and likelihood of need for further procedures. Also discussed option of office balloon/hybrid balloon dilation and office sinus surgery - similar risk profile, will not address septum/ turbs and takes a generally more conservative approach with quicker recovery, however higher possibility for need for future intervention. Risks benefits and alternatives to septoplasty on discussed. risks of bleeding, infection, septal hematoma, injury to the skull base, septal perforation results in whistling, empty nose syndrome, altered sensation in nose and top of mouth, crusting and bleeding as well as continued nasal obstruction were discussed. Patient understood risks and would like to continue with the operation.

## 2024-05-24 NOTE — HISTORY OF PRESENT ILLNESS
[de-identified] : 43 year old female presents with nasal congestion and sinus pressure. States gets sinus pressure under her eyes and at her forehead which comes and goes. Went to ER for sinus infection in the past. In last 12 months has had one sinus infection.  [FreeTextEntry1] : Patient is following up after sinusitis regiment, states it helped pressure and congestion while taking it, but now for last 2 weeks symptoms returned. States steroids also made her heart race. Sinus pressure at forehead, eyes and cheeks is back now, states its not as bad as it was prior to regiment. States discontinued flonase as she was going to allergist.

## 2024-05-24 NOTE — REASON FOR VISIT
[Family Member] : family member [Subsequent Evaluation] : a subsequent evaluation for [FreeTextEntry2] : deviated septum ref by Dr Brennan

## 2024-05-24 NOTE — CONSULT LETTER
[FreeTextEntry1] : Dear Dr. TJ PAPPAS I had the pleasure of evaluating your patient LENNIE ALANIZ, thank you for allowing us to participate in their care. please see full note detailing our visit below. If you have any questions, please do not hesitate to call me and I would be happy to discuss further.   Femi Herzog M.D. Attending Physician,   Department of Otolaryngology - Head and Neck Surgery [facility]  Office: [phone] Fax: [phone]

## 2024-06-21 ENCOUNTER — OFFICE (OUTPATIENT)
Dept: URBAN - METROPOLITAN AREA CLINIC 102 | Facility: CLINIC | Age: 44
Setting detail: OPHTHALMOLOGY
End: 2024-06-21
Payer: COMMERCIAL

## 2024-06-21 DIAGNOSIS — H43.393: ICD-10-CM

## 2024-06-21 DIAGNOSIS — H16.223: ICD-10-CM

## 2024-06-21 DIAGNOSIS — H01.001: ICD-10-CM

## 2024-06-21 DIAGNOSIS — H01.005: ICD-10-CM

## 2024-06-21 DIAGNOSIS — H01.004: ICD-10-CM

## 2024-06-21 DIAGNOSIS — H11.153: ICD-10-CM

## 2024-06-21 DIAGNOSIS — H01.002: ICD-10-CM

## 2024-06-21 DIAGNOSIS — D31.40: ICD-10-CM

## 2024-06-21 PROCEDURE — 92014 COMPRE OPH EXAM EST PT 1/>: CPT | Performed by: OPHTHALMOLOGY

## 2024-06-21 ASSESSMENT — LID EXAM ASSESSMENTS
OD_BLEPHARITIS: RLL RUL 1+
OS_BLEPHARITIS: LLL LUL 2+

## 2024-06-21 ASSESSMENT — CONFRONTATIONAL VISUAL FIELD TEST (CVF)
OS_FINDINGS: FULL
OD_FINDINGS: FULL

## 2024-07-24 ENCOUNTER — NON-APPOINTMENT (OUTPATIENT)
Age: 44
End: 2024-07-24

## 2024-07-30 ENCOUNTER — APPOINTMENT (OUTPATIENT)
Dept: OTOLARYNGOLOGY | Facility: CLINIC | Age: 44
End: 2024-07-30

## 2024-08-09 ENCOUNTER — APPOINTMENT (OUTPATIENT)
Dept: OTOLARYNGOLOGY | Facility: CLINIC | Age: 44
End: 2024-08-09

## 2024-09-25 ENCOUNTER — OUTPATIENT (OUTPATIENT)
Dept: OUTPATIENT SERVICES | Facility: HOSPITAL | Age: 44
LOS: 1 days | End: 2024-09-25
Payer: COMMERCIAL

## 2024-09-25 ENCOUNTER — APPOINTMENT (OUTPATIENT)
Dept: MAMMOGRAPHY | Facility: CLINIC | Age: 44
End: 2024-09-25
Payer: COMMERCIAL

## 2024-09-25 DIAGNOSIS — Z98.51 TUBAL LIGATION STATUS: Chronic | ICD-10-CM

## 2024-09-25 DIAGNOSIS — Z01.419 ENCOUNTER FOR GYNECOLOGICAL EXAMINATION (GENERAL) (ROUTINE) WITHOUT ABNORMAL FINDINGS: ICD-10-CM

## 2024-09-25 DIAGNOSIS — Z41.9 ENCOUNTER FOR PROCEDURE FOR PURPOSES OTHER THAN REMEDYING HEALTH STATE, UNSPECIFIED: Chronic | ICD-10-CM

## 2024-09-25 PROCEDURE — 77063 BREAST TOMOSYNTHESIS BI: CPT

## 2024-09-25 PROCEDURE — 77063 BREAST TOMOSYNTHESIS BI: CPT | Mod: 26

## 2024-09-25 PROCEDURE — 77067 SCR MAMMO BI INCL CAD: CPT

## 2024-09-25 PROCEDURE — 77067 SCR MAMMO BI INCL CAD: CPT | Mod: 26

## 2024-11-20 NOTE — ED STATDOCS - EXITCARE/DISCHARGE INSTRUCTIONS
Detail Level: Zone Detail Level: Detailed Launch Exitcare and print the 'Prescriptions from this Visit' Report

## 2024-12-24 ENCOUNTER — APPOINTMENT (OUTPATIENT)
Dept: ULTRASOUND IMAGING | Facility: CLINIC | Age: 44
End: 2024-12-24

## 2025-01-03 ENCOUNTER — APPOINTMENT (OUTPATIENT)
Dept: ULTRASOUND IMAGING | Facility: CLINIC | Age: 45
End: 2025-01-03
Payer: COMMERCIAL

## 2025-01-03 ENCOUNTER — OUTPATIENT (OUTPATIENT)
Dept: OUTPATIENT SERVICES | Facility: HOSPITAL | Age: 45
LOS: 1 days | End: 2025-01-03
Payer: COMMERCIAL

## 2025-01-03 DIAGNOSIS — Z00.8 ENCOUNTER FOR OTHER GENERAL EXAMINATION: ICD-10-CM

## 2025-01-03 DIAGNOSIS — Z98.51 TUBAL LIGATION STATUS: Chronic | ICD-10-CM

## 2025-01-03 DIAGNOSIS — Z41.9 ENCOUNTER FOR PROCEDURE FOR PURPOSES OTHER THAN REMEDYING HEALTH STATE, UNSPECIFIED: Chronic | ICD-10-CM

## 2025-01-03 PROCEDURE — 76830 TRANSVAGINAL US NON-OB: CPT

## 2025-01-03 PROCEDURE — 76856 US EXAM PELVIC COMPLETE: CPT | Mod: 26

## 2025-01-03 PROCEDURE — 76856 US EXAM PELVIC COMPLETE: CPT

## 2025-01-03 PROCEDURE — 76830 TRANSVAGINAL US NON-OB: CPT | Mod: 26

## 2025-04-15 ENCOUNTER — OFFICE (OUTPATIENT)
Dept: URBAN - METROPOLITAN AREA CLINIC 102 | Facility: CLINIC | Age: 45
Setting detail: OPHTHALMOLOGY
End: 2025-04-15
Payer: COMMERCIAL

## 2025-04-15 DIAGNOSIS — H02.831: ICD-10-CM

## 2025-04-15 DIAGNOSIS — H53.40: ICD-10-CM

## 2025-04-15 DIAGNOSIS — H02.834: ICD-10-CM

## 2025-04-15 DIAGNOSIS — H02.821: ICD-10-CM

## 2025-04-15 PROBLEM — H01.002 BLEPHARITIS; RIGHT UPPER LID, RIGHT LOWER LID, LEFT UPPER LID, LEFT LOWER LID: Status: ACTIVE | Noted: 2025-04-15

## 2025-04-15 PROBLEM — H01.005 BLEPHARITIS; RIGHT UPPER LID, RIGHT LOWER LID, LEFT UPPER LID, LEFT LOWER LID: Status: ACTIVE | Noted: 2025-04-15

## 2025-04-15 PROBLEM — H01.004 BLEPHARITIS; RIGHT UPPER LID, RIGHT LOWER LID, LEFT UPPER LID, LEFT LOWER LID: Status: ACTIVE | Noted: 2025-04-15

## 2025-04-15 PROBLEM — H01.001 BLEPHARITIS; RIGHT UPPER LID, RIGHT LOWER LID, LEFT UPPER LID, LEFT LOWER LID: Status: ACTIVE | Noted: 2025-04-15

## 2025-04-15 PROCEDURE — 92285 EXTERNAL OCULAR PHOTOGRAPHY: CPT | Performed by: OPHTHALMOLOGY

## 2025-04-15 PROCEDURE — 99214 OFFICE O/P EST MOD 30 MIN: CPT | Performed by: OPHTHALMOLOGY

## 2025-04-15 PROCEDURE — 92081 LIMITED VISUAL FIELD XM: CPT | Performed by: OPHTHALMOLOGY

## 2025-04-15 ASSESSMENT — VISUAL ACUITY
OS_BCVA: 20/70-2
OD_BCVA: 20/40-2

## 2025-04-15 ASSESSMENT — LID EXAM ASSESSMENTS
OD_BLEPHARITIS: RLL RUL 1+
OS_BLEPHARITIS: LLL LUL 2+

## 2025-04-15 ASSESSMENT — LID POSITION - COMMENTS
OD_COMMENTS: MRD SKIN &LT
OS_COMMENTS: MRD SKIN &LT
OS_COMMENTS: 2
OD_COMMENTS: 2

## 2025-04-15 ASSESSMENT — REFRACTION_CURRENTRX
OS_VPRISM_DIRECTION: SV
OD_SPHERE: +0.25
OD_VPRISM_DIRECTION: SV
OD_CYLINDER: -1.50
OD_OVR_VA: 20/
OS_SPHERE: PLANO
OS_CYLINDER: -0.75
OD_AXIS: 10
OS_OVR_VA: 20/
OS_AXIS: 180

## 2025-04-15 ASSESSMENT — DECREASING TEAR LAKE - SEVERITY SCORE
OS_DEC_TEARLAKE: T
OD_DEC_TEARLAKE: T

## 2025-04-15 ASSESSMENT — KERATOMETRY
OS_AXISANGLE_DEGREES: 088
OS_K1POWER_DIOPTERS: 45.75
OS_K2POWER_DIOPTERS: 46.75
OD_AXISANGLE_DEGREES: 112
OD_K1POWER_DIOPTERS: 45.75
OD_K2POWER_DIOPTERS: 47.75
METHOD_AUTO_MANUAL: AUTO

## 2025-04-15 ASSESSMENT — LID POSITION - DERMATOCHALASIS
OS_DERMATOCHALASIS: LUL
OD_DERMATOCHALASIS: RUL

## 2025-04-15 ASSESSMENT — CONFRONTATIONAL VISUAL FIELD TEST (CVF)
OS_FINDINGS: FULL
OD_FINDINGS: FULL

## 2025-04-15 ASSESSMENT — REFRACTION_AUTOREFRACTION
OS_SPHERE: +0.25
OD_SPHERE: +0.25
OS_AXIS: 179
OD_AXIS: 022
OD_CYLINDER: -1.75
OS_CYLINDER: -0.75

## 2025-05-20 ENCOUNTER — OFFICE (OUTPATIENT)
Dept: URBAN - METROPOLITAN AREA CLINIC 102 | Facility: CLINIC | Age: 45
Setting detail: OPHTHALMOLOGY
End: 2025-05-20
Payer: COMMERCIAL

## 2025-05-20 ENCOUNTER — RX ONLY (RX ONLY)
Age: 45
End: 2025-05-20

## 2025-05-20 DIAGNOSIS — H02.831: ICD-10-CM

## 2025-05-20 DIAGNOSIS — H02.834: ICD-10-CM

## 2025-05-20 DIAGNOSIS — H16.221: ICD-10-CM

## 2025-05-20 DIAGNOSIS — H16.223: ICD-10-CM

## 2025-05-20 DIAGNOSIS — H02.821: ICD-10-CM

## 2025-05-20 DIAGNOSIS — H16.222: ICD-10-CM

## 2025-05-20 PROBLEM — H01.004 BLEPHARITIS; RIGHT UPPER LID, RIGHT LOWER LID, LEFT UPPER LID, LEFT LOWER LID: Status: ACTIVE | Noted: 2025-05-20

## 2025-05-20 PROBLEM — H01.002 BLEPHARITIS; RIGHT UPPER LID, RIGHT LOWER LID, LEFT UPPER LID, LEFT LOWER LID: Status: ACTIVE | Noted: 2025-05-20

## 2025-05-20 PROBLEM — H01.005 BLEPHARITIS; RIGHT UPPER LID, RIGHT LOWER LID, LEFT UPPER LID, LEFT LOWER LID: Status: ACTIVE | Noted: 2025-05-20

## 2025-05-20 PROBLEM — H01.001 BLEPHARITIS; RIGHT UPPER LID, RIGHT LOWER LID, LEFT UPPER LID, LEFT LOWER LID: Status: ACTIVE | Noted: 2025-05-20

## 2025-05-20 PROCEDURE — 83861 MICROFLUID ANALY TEARS: CPT | Mod: LT | Performed by: OPHTHALMOLOGY

## 2025-05-20 PROCEDURE — 92014 COMPRE OPH EXAM EST PT 1/>: CPT | Performed by: OPHTHALMOLOGY

## 2025-05-20 PROCEDURE — 83861 MICROFLUID ANALY TEARS: CPT | Mod: RT | Performed by: OPHTHALMOLOGY

## 2025-05-20 ASSESSMENT — REFRACTION_AUTOREFRACTION
OD_AXIS: 022
OS_AXIS: 179
OS_CYLINDER: -0.75
OS_SPHERE: +0.25
OD_SPHERE: +0.25
OD_CYLINDER: -1.75

## 2025-05-20 ASSESSMENT — LID POSITION - COMMENTS
OS_COMMENTS: MRD SKIN &LT
OD_COMMENTS: 2
OD_COMMENTS: MRD SKIN &LT
OS_COMMENTS: 2

## 2025-05-20 ASSESSMENT — CONFRONTATIONAL VISUAL FIELD TEST (CVF)
OD_FINDINGS: FULL
OS_FINDINGS: FULL

## 2025-05-20 ASSESSMENT — REFRACTION_CURRENTRX
OD_OVR_VA: 20/
OD_SPHERE: +0.25
OD_VPRISM_DIRECTION: SV
OD_CYLINDER: -1.50
OD_AXIS: 10
OS_OVR_VA: 20/
OS_VPRISM_DIRECTION: SV
OS_AXIS: 180
OS_CYLINDER: -0.75
OS_SPHERE: PLANO

## 2025-05-20 ASSESSMENT — KERATOMETRY
METHOD_AUTO_MANUAL: AUTO
OS_K1POWER_DIOPTERS: 45.75
OS_K2POWER_DIOPTERS: 46.75
OD_K2POWER_DIOPTERS: 47.75
OD_AXISANGLE_DEGREES: 112
OS_AXISANGLE_DEGREES: 088
OD_K1POWER_DIOPTERS: 45.75

## 2025-05-20 ASSESSMENT — LID POSITION - DERMATOCHALASIS
OD_DERMATOCHALASIS: RUL
OS_DERMATOCHALASIS: LUL

## 2025-05-20 ASSESSMENT — LID EXAM ASSESSMENTS
OD_BLEPHARITIS: RLL RUL 1+
OS_BLEPHARITIS: LLL LUL 2+

## 2025-05-20 ASSESSMENT — VISUAL ACUITY
OS_BCVA: 20/70-2
OD_BCVA: 20/40-2

## 2025-05-20 ASSESSMENT — DECREASING TEAR LAKE - SEVERITY SCORE
OS_DEC_TEARLAKE: T
OD_DEC_TEARLAKE: T

## 2025-05-20 ASSESSMENT — SUPERFICIAL PUNCTATE KERATITIS (SPK)
OD_SPK: T
OS_SPK: T